# Patient Record
Sex: MALE | NOT HISPANIC OR LATINO | URBAN - METROPOLITAN AREA
[De-identification: names, ages, dates, MRNs, and addresses within clinical notes are randomized per-mention and may not be internally consistent; named-entity substitution may affect disease eponyms.]

---

## 2018-09-18 ENCOUNTER — OFFICE VISIT CONVERTED (OUTPATIENT)
Dept: CARDIOLOGY | Facility: CLINIC | Age: 58
End: 2018-09-18
Attending: SPECIALIST

## 2018-11-02 ENCOUNTER — CONVERSION ENCOUNTER (OUTPATIENT)
Dept: SURGERY | Facility: CLINIC | Age: 58
End: 2018-11-02

## 2018-11-02 ENCOUNTER — OFFICE VISIT CONVERTED (OUTPATIENT)
Dept: SURGERY | Facility: CLINIC | Age: 58
End: 2018-11-02
Attending: PHYSICIAN ASSISTANT

## 2018-12-03 ENCOUNTER — OFFICE VISIT CONVERTED (OUTPATIENT)
Dept: SURGERY | Facility: CLINIC | Age: 58
End: 2018-12-03
Attending: PHYSICIAN ASSISTANT

## 2018-12-03 ENCOUNTER — CONVERSION ENCOUNTER (OUTPATIENT)
Dept: SURGERY | Facility: CLINIC | Age: 58
End: 2018-12-03

## 2019-02-08 ENCOUNTER — HOSPITAL ENCOUNTER (OUTPATIENT)
Dept: CARDIOLOGY | Facility: HOSPITAL | Age: 59
Discharge: HOME | End: 2019-02-08

## 2021-05-15 VITALS — HEIGHT: 72 IN | BODY MASS INDEX: 33.06 KG/M2 | RESPIRATION RATE: 24 BRPM | WEIGHT: 244.12 LBS

## 2021-05-16 VITALS — WEIGHT: 252.12 LBS | BODY MASS INDEX: 34.15 KG/M2 | HEIGHT: 72 IN | RESPIRATION RATE: 16 BRPM

## 2021-05-16 VITALS
HEIGHT: 72 IN | SYSTOLIC BLOOD PRESSURE: 106 MMHG | BODY MASS INDEX: 33.59 KG/M2 | WEIGHT: 248 LBS | DIASTOLIC BLOOD PRESSURE: 74 MMHG | HEART RATE: 66 BPM

## 2024-04-03 ENCOUNTER — OFFICE VISIT (OUTPATIENT)
Dept: FAMILY MEDICINE CLINIC | Facility: CLINIC | Age: 64
End: 2024-04-03
Payer: COMMERCIAL

## 2024-04-03 VITALS
BODY MASS INDEX: 26.49 KG/M2 | OXYGEN SATURATION: 96 % | HEART RATE: 90 BPM | TEMPERATURE: 97.3 F | DIASTOLIC BLOOD PRESSURE: 80 MMHG | WEIGHT: 195.3 LBS | SYSTOLIC BLOOD PRESSURE: 140 MMHG

## 2024-04-03 DIAGNOSIS — F03.918 DEMENTIA WITH BEHAVIORAL DISTURBANCE: ICD-10-CM

## 2024-04-03 DIAGNOSIS — Z00.00 ANNUAL PHYSICAL EXAM: Primary | ICD-10-CM

## 2024-04-03 DIAGNOSIS — F41.9 ANXIETY: ICD-10-CM

## 2024-04-03 DIAGNOSIS — Z23 ENCOUNTER FOR IMMUNIZATION: ICD-10-CM

## 2024-04-03 DIAGNOSIS — M25.552 LEFT HIP PAIN: ICD-10-CM

## 2024-04-03 DIAGNOSIS — R45.4 IRRITABILITY AND ANGER: ICD-10-CM

## 2024-04-03 PROBLEM — R00.1 JUNCTIONAL BRADYCARDIA: Status: ACTIVE | Noted: 2023-06-21

## 2024-04-03 PROBLEM — I63.9 CEREBROVASCULAR ACCIDENT (CVA): Status: ACTIVE | Noted: 2021-09-30

## 2024-04-03 PROBLEM — I69.322 CVA, OLD, DYSARTHRIA: Chronic | Status: ACTIVE | Noted: 2021-09-30

## 2024-04-03 PROBLEM — G25.9 MOVEMENT DISORDER: Status: ACTIVE | Noted: 2024-02-09

## 2024-04-03 PROBLEM — R73.09 ELEVATED GLUCOSE: Status: ACTIVE | Noted: 2021-09-30

## 2024-04-03 PROBLEM — I10 ESSENTIAL HYPERTENSION: Status: ACTIVE | Noted: 2021-09-30

## 2024-04-03 PROBLEM — T65.91XA: Status: ACTIVE | Noted: 2024-04-03

## 2024-04-03 PROBLEM — E78.5 HYPERLIPIDEMIA: Status: ACTIVE | Noted: 2024-01-14

## 2024-04-03 PROBLEM — R47.1 DYSARTHRIA: Status: ACTIVE | Noted: 2023-06-20

## 2024-04-03 RX ORDER — ATORVASTATIN CALCIUM 40 MG/1
40 TABLET, FILM COATED ORAL DAILY
COMMUNITY
Start: 2024-02-09

## 2024-04-03 RX ORDER — MELATONIN
50 DAILY
COMMUNITY
Start: 2024-02-09

## 2024-04-03 RX ORDER — CLOPIDOGREL BISULFATE 75 MG/1
75 TABLET ORAL DAILY
COMMUNITY
Start: 2024-02-09

## 2024-04-03 RX ORDER — ASPIRIN 81 MG/1
81 TABLET ORAL DAILY
COMMUNITY
Start: 2024-02-09

## 2024-04-03 RX ORDER — AMLODIPINE BESYLATE 2.5 MG/1
2.5 TABLET ORAL DAILY
COMMUNITY
Start: 2024-02-09

## 2024-04-03 RX ORDER — BENZTROPINE MESYLATE 0.5 MG/1
0.5 TABLET ORAL DAILY
COMMUNITY
End: 2024-04-03

## 2024-04-03 RX ORDER — OLANZAPINE 5 MG/1
5 TABLET, ORALLY DISINTEGRATING ORAL DAILY
COMMUNITY
Start: 2024-02-26
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
## 2024-04-04 PROBLEM — F03.918 DEMENTIA WITH BEHAVIORAL DISTURBANCE: Status: ACTIVE | Noted: 2024-04-04

## 2024-04-04 PROBLEM — R45.4 IRRITABILITY AND ANGER: Status: ACTIVE | Noted: 2024-04-04

## 2024-04-04 PROBLEM — F41.9 ANXIETY: Status: ACTIVE | Noted: 2024-04-04

## 2024-04-04 NOTE — ASSESSMENT & PLAN NOTE
Follows neurology.  I advised his caretaker to call neurology and see when they want to next follow-up.  He most recently saw them at the end of February but I did not see a follow-up date listed in their note and he has no future appointment scheduled with them.    I restarted his sertraline at 50 mg daily.  I am not sure how long he has been out of the sertraline and he used to be on 25 mg but neurology had noted they wanted up him to 50 mg as well.  Since 50 mg is an acceptable starting dose, I am just restarting him right on the 50 mg.  This should help with his anxiety and anger issues as well.  I did explain that SSRI such as sertraline take a while to build up in the system so he will not notice an immediate effect.    Note that he used to be on several other medications including Cogentin, Requip, trazodone.  However at least as of January of this year he has not been on any of those.  We may need to restart some of these medications but we should do so slowly and only restart the ones that are necessary to avoid any side effects or polypharmacy.    Follow-up in 1 month or sooner if any questions or concerns.

## 2024-04-05 ENCOUNTER — TELEPHONE (OUTPATIENT)
Dept: FAMILY MEDICINE CLINIC | Facility: CLINIC | Age: 64
End: 2024-04-05

## 2024-04-05 DIAGNOSIS — F51.01 PRIMARY INSOMNIA: Primary | ICD-10-CM

## 2024-04-08 ENCOUNTER — TELEPHONE (OUTPATIENT)
Dept: FAMILY MEDICINE CLINIC | Facility: CLINIC | Age: 64
End: 2024-04-08
Payer: COMMERCIAL

## 2024-04-10 ENCOUNTER — APPOINTMENT (OUTPATIENT)
Dept: GENERAL RADIOLOGY | Facility: HOSPITAL | Age: 64
End: 2024-04-10
Payer: COMMERCIAL

## 2024-04-10 ENCOUNTER — HOSPITAL ENCOUNTER (EMERGENCY)
Facility: HOSPITAL | Age: 64
Discharge: HOME | End: 2024-04-10
Attending: EMERGENCY MEDICINE | Admitting: EMERGENCY MEDICINE
Payer: COMMERCIAL

## 2024-04-10 VITALS
OXYGEN SATURATION: 94 % | SYSTOLIC BLOOD PRESSURE: 144 MMHG | WEIGHT: 199.52 LBS | BODY MASS INDEX: 27.02 KG/M2 | RESPIRATION RATE: 18 BRPM | HEART RATE: 51 BPM | TEMPERATURE: 98.2 F | DIASTOLIC BLOOD PRESSURE: 88 MMHG | HEIGHT: 72 IN

## 2024-04-10 DIAGNOSIS — Z86.59 MENTAL STATUS CHANGE RESOLVED: Primary | ICD-10-CM

## 2024-04-10 DIAGNOSIS — F03.90 DEMENTIA, UNSPECIFIED DEMENTIA SEVERITY, UNSPECIFIED DEMENTIA TYPE, UNSPECIFIED WHETHER BEHAVIORAL, PSYCHOTIC, OR MOOD DISTURBANCE OR ANXIETY: ICD-10-CM

## 2024-04-10 LAB
ALBUMIN SERPL-MCNC: 4.1 G/DL (ref 3.5–5.2)
ALBUMIN/GLOB SERPL: 1.6 G/DL
ALP SERPL-CCNC: 99 U/L (ref 39–117)
ALT SERPL W P-5'-P-CCNC: 11 U/L (ref 1–41)
AMORPH URATE CRY URNS QL MICRO: NORMAL /HPF
ANION GAP SERPL CALCULATED.3IONS-SCNC: 10.9 MMOL/L (ref 5–15)
AST SERPL-CCNC: 18 U/L (ref 1–40)
BACTERIA UR QL AUTO: NORMAL /HPF
BASOPHILS # BLD AUTO: 0.09 10*3/MM3 (ref 0–0.2)
BASOPHILS NFR BLD AUTO: 1.8 % (ref 0–1.5)
BILIRUB SERPL-MCNC: 0.5 MG/DL (ref 0–1.2)
BILIRUB UR QL STRIP: NEGATIVE
BUN SERPL-MCNC: 18 MG/DL (ref 8–23)
BUN/CREAT SERPL: 18.6 (ref 7–25)
CALCIUM SPEC-SCNC: 9.5 MG/DL (ref 8.6–10.5)
CHLORIDE SERPL-SCNC: 106 MMOL/L (ref 98–107)
CLARITY UR: CLEAR
CO2 SERPL-SCNC: 25.1 MMOL/L (ref 22–29)
COLOR UR: YELLOW
CREAT SERPL-MCNC: 0.97 MG/DL (ref 0.76–1.27)
DEPRECATED RDW RBC AUTO: 44.1 FL (ref 37–54)
EGFRCR SERPLBLD CKD-EPI 2021: 88.8 ML/MIN/1.73
EOSINOPHIL # BLD AUTO: 0.16 10*3/MM3 (ref 0–0.4)
EOSINOPHIL NFR BLD AUTO: 3.3 % (ref 0.3–6.2)
ERYTHROCYTE [DISTWIDTH] IN BLOOD BY AUTOMATED COUNT: 13.6 % (ref 12.3–15.4)
GLOBULIN UR ELPH-MCNC: 2.6 GM/DL
GLUCOSE SERPL-MCNC: 89 MG/DL (ref 65–99)
GLUCOSE UR STRIP-MCNC: NEGATIVE MG/DL
HCT VFR BLD AUTO: 45.2 % (ref 37.5–51)
HGB BLD-MCNC: 15.3 G/DL (ref 13–17.7)
HGB UR QL STRIP.AUTO: NEGATIVE
HOLD SPECIMEN: NORMAL
HOLD SPECIMEN: NORMAL
HYALINE CASTS UR QL AUTO: NORMAL /LPF
IMM GRANULOCYTES # BLD AUTO: 0.01 10*3/MM3 (ref 0–0.05)
IMM GRANULOCYTES NFR BLD AUTO: 0.2 % (ref 0–0.5)
KETONES UR QL STRIP: NEGATIVE
LEUKOCYTE ESTERASE UR QL STRIP.AUTO: ABNORMAL
LYMPHOCYTES # BLD AUTO: 1.2 10*3/MM3 (ref 0.7–3.1)
LYMPHOCYTES NFR BLD AUTO: 24.4 % (ref 19.6–45.3)
MAGNESIUM SERPL-MCNC: 1.7 MG/DL (ref 1.6–2.4)
MCH RBC QN AUTO: 30.2 PG (ref 26.6–33)
MCHC RBC AUTO-ENTMCNC: 33.8 G/DL (ref 31.5–35.7)
MCV RBC AUTO: 89.2 FL (ref 79–97)
MONOCYTES # BLD AUTO: 0.61 10*3/MM3 (ref 0.1–0.9)
MONOCYTES NFR BLD AUTO: 12.4 % (ref 5–12)
NEUTROPHILS NFR BLD AUTO: 2.84 10*3/MM3 (ref 1.7–7)
NEUTROPHILS NFR BLD AUTO: 57.9 % (ref 42.7–76)
NITRITE UR QL STRIP: NEGATIVE
NRBC BLD AUTO-RTO: 0 /100 WBC (ref 0–0.2)
PH UR STRIP.AUTO: 5.5 [PH] (ref 5–8)
PLATELET # BLD AUTO: 203 10*3/MM3 (ref 140–450)
PMV BLD AUTO: 10.9 FL (ref 6–12)
POTASSIUM SERPL-SCNC: 4.1 MMOL/L (ref 3.5–5.2)
PROT SERPL-MCNC: 6.7 G/DL (ref 6–8.5)
PROT UR QL STRIP: NEGATIVE
RBC # BLD AUTO: 5.07 10*6/MM3 (ref 4.14–5.8)
RBC # UR STRIP: NORMAL /HPF
REF LAB TEST METHOD: NORMAL
SODIUM SERPL-SCNC: 142 MMOL/L (ref 136–145)
SP GR UR STRIP: 1.02 (ref 1–1.03)
SQUAMOUS #/AREA URNS HPF: NORMAL /HPF
TROPONIN T SERPL HS-MCNC: 12 NG/L
UROBILINOGEN UR QL STRIP: ABNORMAL
WBC # UR STRIP: NORMAL /HPF
WBC NRBC COR # BLD AUTO: 4.91 10*3/MM3 (ref 3.4–10.8)
WHOLE BLOOD HOLD COAG: NORMAL
WHOLE BLOOD HOLD SPECIMEN: NORMAL

## 2024-04-10 PROCEDURE — 83735 ASSAY OF MAGNESIUM: CPT

## 2024-04-10 PROCEDURE — 81001 URINALYSIS AUTO W/SCOPE: CPT | Performed by: EMERGENCY MEDICINE

## 2024-04-10 PROCEDURE — 99283 EMERGENCY DEPT VISIT LOW MDM: CPT

## 2024-04-10 PROCEDURE — 84484 ASSAY OF TROPONIN QUANT: CPT

## 2024-04-10 PROCEDURE — 80053 COMPREHEN METABOLIC PANEL: CPT

## 2024-04-10 PROCEDURE — 85025 COMPLETE CBC W/AUTO DIFF WBC: CPT

## 2024-04-10 PROCEDURE — 71045 X-RAY EXAM CHEST 1 VIEW: CPT

## 2024-04-10 RX ORDER — SODIUM CHLORIDE 0.9 % (FLUSH) 0.9 %
10 SYRINGE (ML) INJECTION AS NEEDED
Status: DISCONTINUED | OUTPATIENT
Start: 2024-04-10 | End: 2024-04-10 | Stop reason: HOSPADM

## 2024-04-10 NOTE — ED PROVIDER NOTES
Time: 1:26 PM EDT  Date of encounter:  4/10/2024  Independent Historian/Clinical History and Information was obtained by:   Patient and Nursing Staff    History is limited by: N/A    Chief Complaint: Mental status change      History of Present Illness:  Patient is a 63 y.o. year old male who presents to the emergency department for evaluation of mental status change.  Per report the patient was having some mental status change.  Per nursing they state the caregiver dropped off for mental status change.  He they report that he is having his good days and bad days.  He denies any current complaints at this time.  He states he feels completely at his baseline and does not want any thing else done.  No fever.  No other complaints this time.    HPI    Patient Care Team  Primary Care Provider: Shelton Terry MD    Past Medical History:     Allergies   Allergen Reactions    Codeine Shortness Of Breath    Acetaminophen Palpitations     Tylenol 3     Past Medical History:   Diagnosis Date    Hyperlipidemia     Stroke      History reviewed. No pertinent surgical history.  Family History   Family history unknown: Yes       Home Medications:  Prior to Admission medications    Medication Sig Start Date End Date Taking? Authorizing Provider   amLODIPine (NORVASC) 2.5 MG tablet Take 1 tablet by mouth Daily. 2/9/24   Arturo Garcia MD   aspirin 81 MG EC tablet Take 1 tablet by mouth Daily. 2/9/24   Arturo Garcia MD   atorvastatin (LIPITOR) 40 MG tablet Take 1 tablet by mouth Daily. 2/9/24   Arturo Garcia MD   Cholecalciferol 25 MCG (1000 UT) tablet Take 2 tablets by mouth Daily. 2/9/24   Arturo Garcia MD   clopidogrel (PLAVIX) 75 MG tablet Take 1 tablet by mouth Daily. 2/9/24   Arturo Garcia MD   Diclofenac Sodium (Voltaren) 1 % gel gel Apply 4 g topically to the appropriate area as directed 4 (Four) Times a Day As Needed (hand pain). 4/3/24   Shelton Terry MD   Melatonin 3 MG  "capsule Take 1 capsule by mouth At Night As Needed (insomnia) for up to 60 days. Take a few hours before bedtime. 4/5/24 6/4/24  Shelton Terry MD   OLANZapine zydis (zyPREXA) 5 MG disintegrating tablet Take 1 tablet by mouth Daily. 2/26/24   Provider, MD Arturo   sertraline (ZOLOFT) 50 MG tablet Take 1 tablet by mouth Daily for 180 days. 4/3/24 9/30/24  Shelton Terry MD        Social History:   Social History     Tobacco Use    Smoking status: Never    Smokeless tobacco: Never   Vaping Use    Vaping status: Never Used   Substance Use Topics    Alcohol use: Never    Drug use: Never         Review of Systems:  Review of Systems     Physical Exam:  /100   Pulse 51   Temp 98.2 °F (36.8 °C) (Oral)   Resp 18   Ht 182.9 cm (72\")   Wt 90.5 kg (199 lb 8.3 oz)   SpO2 94%   BMI 27.06 kg/m²     Physical Exam  Vitals and nursing note reviewed.   Constitutional:       Appearance: Normal appearance.   HENT:      Head: Normocephalic and atraumatic.   Eyes:      General: No scleral icterus.  Cardiovascular:      Rate and Rhythm: Normal rate and regular rhythm.      Heart sounds: Normal heart sounds.   Pulmonary:      Effort: Pulmonary effort is normal.      Breath sounds: Normal breath sounds.   Abdominal:      Palpations: Abdomen is soft.      Tenderness: There is no abdominal tenderness.   Musculoskeletal:         General: Normal range of motion.      Cervical back: Normal range of motion.   Skin:     Findings: No rash.   Neurological:      General: No focal deficit present.      Mental Status: He is alert.                  Procedures:  Procedures      Medical Decision Making:      Comorbidities that affect care:    Dementia, hypertension, stroke    External Notes reviewed:    Reviewed PCP note from 4/3/2024      The following orders were placed and all results were independently analyzed by me:  Orders Placed This Encounter   Procedures    XR Chest 1 View    [place] Draw    Comprehensive Metabolic " Panel    Single High Sensitivity Troponin T    Magnesium    Urinalysis With Microscopic If Indicated (No Culture) - Urine, Clean Catch    CBC Auto Differential    Urinalysis, Microscopic Only - Urine, Clean Catch    NPO Diet NPO Type: Strict NPO    Undress & Gown    Continuous Pulse Oximetry    Vital Signs    Orthostatic Blood Pressure    Oxygen Therapy- Nasal Cannula; Titrate 1-6 LPM Per SpO2; 90 - 95%    POC Glucose Once    Insert Peripheral IV    Fall Precautions    CBC & Differential    Green Top (Gel)    Lavender Top    Gold Top - SST    Light Blue Top       Medications Given in the Emergency Department:  Medications   sodium chloride 0.9 % flush 10 mL (has no administration in time range)        ED Course:         Labs:    Lab Results (last 24 hours)       Procedure Component Value Units Date/Time    CBC & Differential [[id number]]  (Abnormal) Collected: 04/10/24 1122    Specimen: Blood Updated: 04/10/24 1130    Narrative:      The following orders were created for panel order CBC & Differential.  Procedure                               Abnormality         Status                     ---------                               -----------         ------                     CBC Auto Differential[[id number]]        Abnormal            Final result                 Please view results for these tests on the individual orders.    Comprehensive Metabolic Panel [[id number]] Collected: 04/10/24 1122    Specimen: Blood Updated: 04/10/24 1149     Glucose 89 mg/dL      BUN 18 mg/dL      Creatinine 0.97 mg/dL      Sodium 142 mmol/L      Potassium 4.1 mmol/L      Chloride 106 mmol/L      CO2 25.1 mmol/L      Calcium 9.5 mg/dL      Total Protein 6.7 g/dL      Albumin 4.1 g/dL      ALT (SGPT) 11 U/L      AST (SGOT) 18 U/L      Alkaline Phosphatase 99 U/L      Total Bilirubin 0.5 mg/dL      Globulin 2.6 gm/dL      A/G Ratio 1.6 g/dL      BUN/Creatinine Ratio 18.6     Anion Gap 10.9 mmol/L      eGFR 88.8 mL/min/1.73     Narrative:       GFR Normal >60  Chronic Kidney Disease <60  Kidney Failure <15      Single High Sensitivity Troponin T [[id number]]  (Normal) Collected: 04/10/24 1122    Specimen: Blood Updated: 04/10/24 1149     HS Troponin T 12 ng/L     Narrative:      High Sensitive Troponin T Reference Range:  <14.0 ng/L- Negative Female for AMI  <22.0 ng/L- Negative Male for AMI  >=14 - Abnormal Female indicating possible myocardial injury.  >=22 - Abnormal Male indicating possible myocardial injury.   Clinicians would have to utilize clinical acumen, EKG, Troponin, and serial changes to determine if it is an Acute Myocardial Infarction or myocardial injury due to an underlying chronic condition.         Magnesium [[id number]]  (Normal) Collected: 04/10/24 1122    Specimen: Blood Updated: 04/10/24 1149     Magnesium 1.7 mg/dL     CBC Auto Differential [[id number]]  (Abnormal) Collected: 04/10/24 1122    Specimen: Blood Updated: 04/10/24 1130     WBC 4.91 10*3/mm3      RBC 5.07 10*6/mm3      Hemoglobin 15.3 g/dL      Hematocrit 45.2 %      MCV 89.2 fL      MCH 30.2 pg      MCHC 33.8 g/dL      RDW 13.6 %      RDW-SD 44.1 fl      MPV 10.9 fL      Platelets 203 10*3/mm3      Neutrophil % 57.9 %      Lymphocyte % 24.4 %      Monocyte % 12.4 %      Eosinophil % 3.3 %      Basophil % 1.8 %      Immature Grans % 0.2 %      Neutrophils, Absolute 2.84 10*3/mm3      Lymphocytes, Absolute 1.20 10*3/mm3      Monocytes, Absolute 0.61 10*3/mm3      Eosinophils, Absolute 0.16 10*3/mm3      Basophils, Absolute 0.09 10*3/mm3      Immature Grans, Absolute 0.01 10*3/mm3      nRBC 0.0 /100 WBC     Urinalysis With Microscopic If Indicated (No Culture) - Urine, Clean Catch [[id number]]  (Abnormal) Collected: 04/10/24 1310    Specimen: Urine, Clean Catch Updated: 04/10/24 1327     Color, UA Yellow     Appearance, UA Clear     pH, UA 5.5     Specific Gravity, UA 1.024     Glucose, UA Negative     Ketones, UA Negative     Bilirubin, UA Negative     Blood, UA Negative      Protein, UA Negative     Leuk Esterase, UA Trace     Nitrite, UA Negative     Urobilinogen, UA 1.0 E.U./dL    Urinalysis, Microscopic Only - Urine, Clean Catch [[id number]] Collected: 04/10/24 1310    Specimen: Urine, Clean Catch Updated: 04/10/24 1332     RBC, UA None Seen /HPF      WBC, UA 0-2 /HPF      Bacteria, UA None Seen /HPF      Squamous Epithelial Cells, UA None Seen /HPF      Hyaline Casts, UA None Seen /LPF      Amorphous Crystals, UA Moderate/2+ /HPF      Methodology Manual Light Microscopy             Imaging:    XR Chest 1 View    Result Date: 4/10/2024  XR CHEST 1 VW-  Date of Exam: 4/10/2024 11:37 AM  Indication: Weak/Dizzy/AMS triage protocol  Comparison: Chest AP dated 5/22/2018  Findings: The lungs are clear bilaterally. The cardiac and mediastinal silhouettes appear normal. No effusion is seen.      Impression: 1.  No acute cardiopulmonary disease   Electronically Signed By-Hung Camarillo MD On:4/10/2024 12:33 PM         Differential Diagnosis and Discussion:    Altered Mental Status: Based on the patient's signs and symptoms, differential diagnosis includes but is not limited to meningitis, stroke, sepsis, subarachnoid hemorrhage, intracranial bleeding, encephalitis, and metabolic encephalopathy.    All labs were reviewed and interpreted by me.  All X-rays impressions were independently interpreted by me.    MDM     Patient is a 63-year-old gentleman who has a history of dementia as well as stroke who presents with complaints of possible altered mental status.  He is completely normal here and at his baseline.  Caregiver had called and spoke with nursing who stated he has periods where he is completely alert and oriented and periods where he is not.  According to previous record he has a history of dementia.  He does not meet possible criteria for nursing home placement but caregiver stated that she could take care of him.  Labs and imaging here did not show any acute findings.  Since he is at  his baseline I do not think he needs a head scan.  Will have the patient followed up as an outpatient.  Will DC.          Patient Care Considerations:    CT of the head but patient currently at baseline      Consultants/Shared Management Plan:    None    Social Determinants of Health:    Patient has presented with family members who are responsible, reliable and will ensure follow up care.      Disposition and Care Coordination:    Discharged: The patient is suitable and stable for discharge with no need for consideration of admission.    I have explained the patient´s condition, diagnoses and treatment plan based on the information available to me at this time. I have answered questions and addressed any concerns. The patient has a good  understanding of the patient´s diagnosis, condition, and treatment plan as can be expected at this point. The vital signs have been stable. The patient´s condition is stable and appropriate for discharge from the emergency department.      The patient will pursue further outpatient evaluation with the primary care physician or other designated or consulting physician as outlined in the discharge instructions. They are agreeable to this plan of care and follow-up instructions have been explained in detail. The patient has received these instructions in written format and have expressed an understanding of the discharge instructions. The patient is aware that any significant change in condition or worsening of symptoms should prompt an immediate return to this or the closest emergency department or call to 911.      Final diagnoses:   Mental status change resolved   Dementia, unspecified dementia severity, unspecified dementia type, unspecified whether behavioral, psychotic, or mood disturbance or anxiety        ED Disposition       ED Disposition   Discharge    Condition   Stable    Comment   --               This medical record created using voice recognition software.              Andrae Fajardo MD  04/10/24 1957

## 2024-04-15 ENCOUNTER — APPOINTMENT (OUTPATIENT)
Dept: CT IMAGING | Facility: HOSPITAL | Age: 64
DRG: 884 | End: 2024-04-15
Payer: COMMERCIAL

## 2024-04-15 ENCOUNTER — HOSPITAL ENCOUNTER (INPATIENT)
Facility: HOSPITAL | Age: 64
LOS: 6 days | Discharge: HOME | DRG: 884 | End: 2024-04-21
Attending: EMERGENCY MEDICINE | Admitting: FAMILY MEDICINE
Payer: COMMERCIAL

## 2024-04-15 DIAGNOSIS — F01.B11 MODERATE VASCULAR DEMENTIA WITH AGITATION: Primary | ICD-10-CM

## 2024-04-15 DIAGNOSIS — R26.2 DIFFICULTY WALKING: ICD-10-CM

## 2024-04-15 DIAGNOSIS — Z78.9 DECREASED ACTIVITIES OF DAILY LIVING (ADL): ICD-10-CM

## 2024-04-15 DIAGNOSIS — R13.12 OROPHARYNGEAL DYSPHAGIA: ICD-10-CM

## 2024-04-15 PROBLEM — R53.81 PHYSICAL DECONDITIONING: Status: ACTIVE | Noted: 2024-04-15

## 2024-04-15 LAB
ALBUMIN SERPL-MCNC: 4.1 G/DL (ref 3.5–5.2)
ALBUMIN/GLOB SERPL: 1.6 G/DL
ALP SERPL-CCNC: 90 U/L (ref 39–117)
ALT SERPL W P-5'-P-CCNC: 18 U/L (ref 1–41)
AMMONIA BLD-SCNC: 23 UMOL/L (ref 16–60)
AMPHET+METHAMPHET UR QL: NEGATIVE
ANION GAP SERPL CALCULATED.3IONS-SCNC: 9.8 MMOL/L (ref 5–15)
AST SERPL-CCNC: 22 U/L (ref 1–40)
BARBITURATES UR QL SCN: NEGATIVE
BASOPHILS # BLD AUTO: 0.1 10*3/MM3 (ref 0–0.2)
BASOPHILS NFR BLD AUTO: 2 % (ref 0–1.5)
BENZODIAZ UR QL SCN: NEGATIVE
BILIRUB SERPL-MCNC: 0.5 MG/DL (ref 0–1.2)
BILIRUB UR QL STRIP: NEGATIVE
BUN SERPL-MCNC: 23 MG/DL (ref 8–23)
BUN/CREAT SERPL: 24.7 (ref 7–25)
CALCIUM SPEC-SCNC: 8.9 MG/DL (ref 8.6–10.5)
CANNABINOIDS SERPL QL: NEGATIVE
CHLORIDE SERPL-SCNC: 107 MMOL/L (ref 98–107)
CLARITY UR: CLEAR
CO2 SERPL-SCNC: 26.2 MMOL/L (ref 22–29)
COCAINE UR QL: NEGATIVE
COLOR UR: YELLOW
CREAT SERPL-MCNC: 0.93 MG/DL (ref 0.76–1.27)
DEPRECATED RDW RBC AUTO: 44.8 FL (ref 37–54)
EGFRCR SERPLBLD CKD-EPI 2021: 93.4 ML/MIN/1.73
EOSINOPHIL # BLD AUTO: 0.12 10*3/MM3 (ref 0–0.4)
EOSINOPHIL NFR BLD AUTO: 2.4 % (ref 0.3–6.2)
ERYTHROCYTE [DISTWIDTH] IN BLOOD BY AUTOMATED COUNT: 13.5 % (ref 12.3–15.4)
ETHANOL BLD-MCNC: <10 MG/DL (ref 0–10)
ETHANOL UR QL: <0.01 %
FENTANYL UR-MCNC: NEGATIVE NG/ML
GLOBULIN UR ELPH-MCNC: 2.5 GM/DL
GLUCOSE SERPL-MCNC: 82 MG/DL (ref 65–99)
GLUCOSE UR STRIP-MCNC: NEGATIVE MG/DL
HCT VFR BLD AUTO: 45.1 % (ref 37.5–51)
HGB BLD-MCNC: 15 G/DL (ref 13–17.7)
HGB UR QL STRIP.AUTO: NEGATIVE
IMM GRANULOCYTES # BLD AUTO: 0.02 10*3/MM3 (ref 0–0.05)
IMM GRANULOCYTES NFR BLD AUTO: 0.4 % (ref 0–0.5)
KETONES UR QL STRIP: NEGATIVE
LEUKOCYTE ESTERASE UR QL STRIP.AUTO: NEGATIVE
LYMPHOCYTES # BLD AUTO: 1.18 10*3/MM3 (ref 0.7–3.1)
LYMPHOCYTES NFR BLD AUTO: 23.3 % (ref 19.6–45.3)
MAGNESIUM SERPL-MCNC: 1.7 MG/DL (ref 1.6–2.4)
MCH RBC QN AUTO: 30.1 PG (ref 26.6–33)
MCHC RBC AUTO-ENTMCNC: 33.3 G/DL (ref 31.5–35.7)
MCV RBC AUTO: 90.4 FL (ref 79–97)
METHADONE UR QL SCN: NEGATIVE
MONOCYTES # BLD AUTO: 0.55 10*3/MM3 (ref 0.1–0.9)
MONOCYTES NFR BLD AUTO: 10.8 % (ref 5–12)
NEUTROPHILS NFR BLD AUTO: 3.1 10*3/MM3 (ref 1.7–7)
NEUTROPHILS NFR BLD AUTO: 61.1 % (ref 42.7–76)
NITRITE UR QL STRIP: NEGATIVE
NRBC BLD AUTO-RTO: 0 /100 WBC (ref 0–0.2)
OPIATES UR QL: NEGATIVE
OXYCODONE UR QL SCN: NEGATIVE
PH UR STRIP.AUTO: 5.5 [PH] (ref 5–8)
PLATELET # BLD AUTO: 187 10*3/MM3 (ref 140–450)
PMV BLD AUTO: 11.2 FL (ref 6–12)
POTASSIUM SERPL-SCNC: 3.9 MMOL/L (ref 3.5–5.2)
PROT SERPL-MCNC: 6.6 G/DL (ref 6–8.5)
PROT UR QL STRIP: NEGATIVE
RBC # BLD AUTO: 4.99 10*6/MM3 (ref 4.14–5.8)
SODIUM SERPL-SCNC: 143 MMOL/L (ref 136–145)
SP GR UR STRIP: 1.03 (ref 1–1.03)
T4 FREE SERPL-MCNC: 1 NG/DL (ref 0.92–1.68)
TSH SERPL DL<=0.05 MIU/L-ACNC: 2.5 UIU/ML (ref 0.27–4.2)
UROBILINOGEN UR QL STRIP: NORMAL
WBC NRBC COR # BLD AUTO: 5.07 10*3/MM3 (ref 3.4–10.8)

## 2024-04-15 PROCEDURE — 80307 DRUG TEST PRSMV CHEM ANLYZR: CPT | Performed by: EMERGENCY MEDICINE

## 2024-04-15 PROCEDURE — 70450 CT HEAD/BRAIN W/O DYE: CPT

## 2024-04-15 PROCEDURE — 36415 COLL VENOUS BLD VENIPUNCTURE: CPT

## 2024-04-15 PROCEDURE — 83735 ASSAY OF MAGNESIUM: CPT | Performed by: EMERGENCY MEDICINE

## 2024-04-15 PROCEDURE — 82140 ASSAY OF AMMONIA: CPT | Performed by: EMERGENCY MEDICINE

## 2024-04-15 PROCEDURE — 51702 INSERT TEMP BLADDER CATH: CPT

## 2024-04-15 PROCEDURE — 84439 ASSAY OF FREE THYROXINE: CPT | Performed by: EMERGENCY MEDICINE

## 2024-04-15 PROCEDURE — 80050 GENERAL HEALTH PANEL: CPT | Performed by: EMERGENCY MEDICINE

## 2024-04-15 PROCEDURE — 99222 1ST HOSP IP/OBS MODERATE 55: CPT | Performed by: FAMILY MEDICINE

## 2024-04-15 PROCEDURE — 93005 ELECTROCARDIOGRAM TRACING: CPT | Performed by: EMERGENCY MEDICINE

## 2024-04-15 PROCEDURE — 81003 URINALYSIS AUTO W/O SCOPE: CPT | Performed by: EMERGENCY MEDICINE

## 2024-04-15 PROCEDURE — 25010000002 ZIPRASIDONE MESYLATE PER 10 MG: Performed by: EMERGENCY MEDICINE

## 2024-04-15 PROCEDURE — 99285 EMERGENCY DEPT VISIT HI MDM: CPT

## 2024-04-15 PROCEDURE — 82077 ASSAY SPEC XCP UR&BREATH IA: CPT | Performed by: EMERGENCY MEDICINE

## 2024-04-15 RX ORDER — SODIUM CHLORIDE 0.9 % (FLUSH) 0.9 %
10 SYRINGE (ML) INJECTION AS NEEDED
Status: DISCONTINUED | OUTPATIENT
Start: 2024-04-15 | End: 2024-04-21 | Stop reason: HOSPADM

## 2024-04-15 RX ORDER — ZIPRASIDONE MESYLATE 20 MG/ML
20 INJECTION, POWDER, LYOPHILIZED, FOR SOLUTION INTRAMUSCULAR ONCE
Status: COMPLETED | OUTPATIENT
Start: 2024-04-15 | End: 2024-04-15

## 2024-04-15 RX ADMIN — ZIPRASIDONE MESYLATE 20 MG: 20 INJECTION, POWDER, LYOPHILIZED, FOR SOLUTION INTRAMUSCULAR at 19:06
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
## 2024-04-15 NOTE — ED NOTES
When asked questions to check neuro status patient did not know the year, month, or why he was here, he also did not know what town he was in, he does know who he is.
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
## 2024-04-15 NOTE — SIGNIFICANT NOTE
04/15/24 1920   Plan   Final Note RUCHI continuation from prior note. RUCHI ensured Radha was ok, however, if pt was able to discharge this date she would need to be present at the hospital to  pt due to not being able to return to his prior arrangements. Radha advised RUCHI that she understood and would contact RUCHI back. RUCHI provided Radha with her direct phone number, [phone], for contact this date. RUCHI updated provider.
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
## 2024-04-16 LAB
ALBUMIN SERPL-MCNC: 3.8 G/DL (ref 3.5–5.2)
ALBUMIN/GLOB SERPL: 1.5 G/DL
ALP SERPL-CCNC: 80 U/L (ref 39–117)
ALT SERPL W P-5'-P-CCNC: 17 U/L (ref 1–41)
ANION GAP SERPL CALCULATED.3IONS-SCNC: 11.5 MMOL/L (ref 5–15)
AST SERPL-CCNC: 23 U/L (ref 1–40)
BASOPHILS # BLD AUTO: 0.14 10*3/MM3 (ref 0–0.2)
BASOPHILS NFR BLD AUTO: 2.5 % (ref 0–1.5)
BILIRUB SERPL-MCNC: 0.6 MG/DL (ref 0–1.2)
BUN SERPL-MCNC: 21 MG/DL (ref 8–23)
BUN/CREAT SERPL: 27.6 (ref 7–25)
CALCIUM SPEC-SCNC: 8.8 MG/DL (ref 8.6–10.5)
CHLORIDE SERPL-SCNC: 107 MMOL/L (ref 98–107)
CO2 SERPL-SCNC: 22.5 MMOL/L (ref 22–29)
CREAT SERPL-MCNC: 0.76 MG/DL (ref 0.76–1.27)
DEPRECATED RDW RBC AUTO: 44.9 FL (ref 37–54)
EGFRCR SERPLBLD CKD-EPI 2021: 102.3 ML/MIN/1.73
EOSINOPHIL # BLD AUTO: 0.18 10*3/MM3 (ref 0–0.4)
EOSINOPHIL NFR BLD AUTO: 3.2 % (ref 0.3–6.2)
ERYTHROCYTE [DISTWIDTH] IN BLOOD BY AUTOMATED COUNT: 13.4 % (ref 12.3–15.4)
GLOBULIN UR ELPH-MCNC: 2.5 GM/DL
GLUCOSE SERPL-MCNC: 76 MG/DL (ref 65–99)
HCT VFR BLD AUTO: 45.8 % (ref 37.5–51)
HGB BLD-MCNC: 14.5 G/DL (ref 13–17.7)
IMM GRANULOCYTES # BLD AUTO: 0.01 10*3/MM3 (ref 0–0.05)
IMM GRANULOCYTES NFR BLD AUTO: 0.2 % (ref 0–0.5)
LYMPHOCYTES # BLD AUTO: 1.35 10*3/MM3 (ref 0.7–3.1)
LYMPHOCYTES NFR BLD AUTO: 24 % (ref 19.6–45.3)
MCH RBC QN AUTO: 29.4 PG (ref 26.6–33)
MCHC RBC AUTO-ENTMCNC: 31.7 G/DL (ref 31.5–35.7)
MCV RBC AUTO: 92.7 FL (ref 79–97)
MONOCYTES # BLD AUTO: 0.59 10*3/MM3 (ref 0.1–0.9)
MONOCYTES NFR BLD AUTO: 10.5 % (ref 5–12)
NEUTROPHILS NFR BLD AUTO: 3.35 10*3/MM3 (ref 1.7–7)
NEUTROPHILS NFR BLD AUTO: 59.6 % (ref 42.7–76)
NRBC BLD AUTO-RTO: 0 /100 WBC (ref 0–0.2)
PLATELET # BLD AUTO: 183 10*3/MM3 (ref 140–450)
PMV BLD AUTO: 11.4 FL (ref 6–12)
POTASSIUM SERPL-SCNC: 3.7 MMOL/L (ref 3.5–5.2)
PROT SERPL-MCNC: 6.3 G/DL (ref 6–8.5)
RBC # BLD AUTO: 4.94 10*6/MM3 (ref 4.14–5.8)
SODIUM SERPL-SCNC: 141 MMOL/L (ref 136–145)
WBC NRBC COR # BLD AUTO: 5.62 10*3/MM3 (ref 3.4–10.8)

## 2024-04-16 PROCEDURE — 92610 EVALUATE SWALLOWING FUNCTION: CPT

## 2024-04-16 PROCEDURE — 25010000002 HEPARIN (PORCINE) PER 1000 UNITS: Performed by: FAMILY MEDICINE

## 2024-04-16 PROCEDURE — 25810000003 SODIUM CHLORIDE 0.9 % SOLUTION: Performed by: FAMILY MEDICINE

## 2024-04-16 PROCEDURE — 80053 COMPREHEN METABOLIC PANEL: CPT | Performed by: FAMILY MEDICINE

## 2024-04-16 PROCEDURE — 85025 COMPLETE CBC W/AUTO DIFF WBC: CPT | Performed by: FAMILY MEDICINE

## 2024-04-16 PROCEDURE — 99232 SBSQ HOSP IP/OBS MODERATE 35: CPT | Performed by: INTERNAL MEDICINE

## 2024-04-16 RX ORDER — BISACODYL 5 MG/1
5 TABLET, DELAYED RELEASE ORAL DAILY PRN
Status: DISCONTINUED | OUTPATIENT
Start: 2024-04-16 | End: 2024-04-21 | Stop reason: HOSPADM

## 2024-04-16 RX ORDER — AMOXICILLIN 250 MG
2 CAPSULE ORAL 2 TIMES DAILY PRN
Status: DISCONTINUED | OUTPATIENT
Start: 2024-04-16 | End: 2024-04-21 | Stop reason: HOSPADM

## 2024-04-16 RX ORDER — HALOPERIDOL 5 MG/ML
3 INJECTION INTRAMUSCULAR EVERY 6 HOURS PRN
Status: DISCONTINUED | OUTPATIENT
Start: 2024-04-16 | End: 2024-04-17

## 2024-04-16 RX ORDER — SODIUM CHLORIDE 9 MG/ML
40 INJECTION, SOLUTION INTRAVENOUS AS NEEDED
Status: DISCONTINUED | OUTPATIENT
Start: 2024-04-16 | End: 2024-04-21 | Stop reason: HOSPADM

## 2024-04-16 RX ORDER — SODIUM CHLORIDE 0.9 % (FLUSH) 0.9 %
10 SYRINGE (ML) INJECTION EVERY 12 HOURS SCHEDULED
Status: DISCONTINUED | OUTPATIENT
Start: 2024-04-16 | End: 2024-04-21 | Stop reason: HOSPADM

## 2024-04-16 RX ORDER — CLOPIDOGREL BISULFATE 75 MG/1
75 TABLET ORAL DAILY
Status: DISCONTINUED | OUTPATIENT
Start: 2024-04-17 | End: 2024-04-21 | Stop reason: HOSPADM

## 2024-04-16 RX ORDER — MELATONIN
2000 DAILY
Status: DISCONTINUED | OUTPATIENT
Start: 2024-04-17 | End: 2024-04-21 | Stop reason: HOSPADM

## 2024-04-16 RX ORDER — SODIUM CHLORIDE 0.9 % (FLUSH) 0.9 %
10 SYRINGE (ML) INJECTION AS NEEDED
Status: DISCONTINUED | OUTPATIENT
Start: 2024-04-16 | End: 2024-04-21 | Stop reason: HOSPADM

## 2024-04-16 RX ORDER — RISPERIDONE 1 MG/ML
0.5 SOLUTION ORAL NIGHTLY
Status: DISCONTINUED | OUTPATIENT
Start: 2024-04-16 | End: 2024-04-16

## 2024-04-16 RX ORDER — AMLODIPINE BESYLATE 5 MG/1
2.5 TABLET ORAL DAILY
Status: DISCONTINUED | OUTPATIENT
Start: 2024-04-17 | End: 2024-04-21 | Stop reason: HOSPADM

## 2024-04-16 RX ORDER — ASPIRIN 81 MG/1
81 TABLET ORAL DAILY
Status: DISCONTINUED | OUTPATIENT
Start: 2024-04-17 | End: 2024-04-21 | Stop reason: HOSPADM

## 2024-04-16 RX ORDER — POLYETHYLENE GLYCOL 3350 17 G/17G
17 POWDER, FOR SOLUTION ORAL DAILY PRN
Status: DISCONTINUED | OUTPATIENT
Start: 2024-04-16 | End: 2024-04-21 | Stop reason: HOSPADM

## 2024-04-16 RX ORDER — BISACODYL 10 MG
10 SUPPOSITORY, RECTAL RECTAL DAILY PRN
Status: DISCONTINUED | OUTPATIENT
Start: 2024-04-16 | End: 2024-04-21 | Stop reason: HOSPADM

## 2024-04-16 RX ORDER — SODIUM CHLORIDE 9 MG/ML
75 INJECTION, SOLUTION INTRAVENOUS CONTINUOUS
Status: DISCONTINUED | OUTPATIENT
Start: 2024-04-16 | End: 2024-04-17

## 2024-04-16 RX ORDER — HEPARIN SODIUM 5000 [USP'U]/ML
5000 INJECTION, SOLUTION INTRAVENOUS; SUBCUTANEOUS EVERY 8 HOURS SCHEDULED
Status: DISCONTINUED | OUTPATIENT
Start: 2024-04-16 | End: 2024-04-16

## 2024-04-16 RX ORDER — ZIPRASIDONE MESYLATE 20 MG/ML
20 INJECTION, POWDER, LYOPHILIZED, FOR SOLUTION INTRAMUSCULAR EVERY 4 HOURS PRN
Status: DISCONTINUED | OUTPATIENT
Start: 2024-04-16 | End: 2024-04-16

## 2024-04-16 RX ORDER — ATORVASTATIN CALCIUM 40 MG/1
40 TABLET, FILM COATED ORAL NIGHTLY
Status: DISCONTINUED | OUTPATIENT
Start: 2024-04-16 | End: 2024-04-21 | Stop reason: HOSPADM

## 2024-04-16 RX ORDER — OLANZAPINE 5 MG/1
5 TABLET, ORALLY DISINTEGRATING ORAL NIGHTLY
Status: DISCONTINUED | OUTPATIENT
Start: 2024-04-17 | End: 2024-04-21 | Stop reason: HOSPADM

## 2024-04-16 RX ADMIN — HEPARIN SODIUM 5000 UNITS: 5000 INJECTION INTRAVENOUS; SUBCUTANEOUS at 06:00

## 2024-04-16 RX ADMIN — Medication 10 ML: at 02:17

## 2024-04-16 RX ADMIN — OLANZAPINE 5 MG: 5 TABLET, ORALLY DISINTEGRATING ORAL at 23:43

## 2024-04-16 RX ADMIN — RISPERIDONE 0.5 MG: 1 SOLUTION ORAL at 20:08

## 2024-04-16 RX ADMIN — APIXABAN 2.5 MG: 2.5 TABLET, FILM COATED ORAL at 20:08

## 2024-04-16 RX ADMIN — Medication 10 ML: at 20:09

## 2024-04-16 RX ADMIN — ATORVASTATIN CALCIUM 40 MG: 40 TABLET, FILM COATED ORAL at 23:43

## 2024-04-16 RX ADMIN — SODIUM CHLORIDE 75 ML/HR: 9 INJECTION, SOLUTION INTRAVENOUS at 15:43

## 2024-04-16 RX ADMIN — SODIUM CHLORIDE 75 ML/HR: 9 INJECTION, SOLUTION INTRAVENOUS at 02:17

## 2024-04-16 NOTE — THERAPY EVALUATION
Acute Care - Speech Language Pathology   Swallow Initial Evaluation [facility]     Patient Name: Jamshid Carrero  : 1960  MRN: [id number]  Today's Date: 2024               Admit Date: 4/15/2024    Visit Dx:     ICD-10-CM ICD-9-CM   1. Moderate vascular dementia with agitation  F01.B11 290.40   2. Oropharyngeal dysphagia  R13.12 787.22     Patient Active Problem List   Diagnosis    Antifreeze poisoning    CVA, old, dysarthria    Hyperlipidemia    Elevated glucose    Dysarthria    Essential hypertension    Junctional bradycardia    Movement disorder    Cerebrovascular accident (CVA)    Anxiety    Dementia with behavioral disturbance    Irritability and anger    Physical deconditioning     Past Medical History:   Diagnosis Date    Dementia     Hyperlipidemia     Stroke      History reviewed. No pertinent surgical history.    SLP Recommendation and Plan             Inpatient Speech Pathology Dysphagia Evaluation        PAIN SCALE: None indicated    PRECAUTIONS/CONTRAINDICATIONS: Standard, fall    SUSPECTED ABUSE/NEGLECT/EXPLOITATION: None identified    SOCIAL/PSYCHOLOGICAL NEEDS/BARRIERS: None identified    PAST SOCIAL HISTORY: 63-year-old male, had been living at home with his family    PRIOR FUNCTION: Currently n.p.o.    PATIENT GOALS/EXPECTATIONS: Patient wants to eat and drink    HISTORY: 63-year-old male admitted to [facility] on 4/15/2024 secondary to physical deconditioning.  Patient with history of CVA.  Family reportedly wishing placement as they are unable to care for him properly at home.    CURRENT DIET LEVEL: N.p.o.    OBJECTIVE:    TEST ADMINISTERED: Clinical dysphagia evaluation    COGNITION/SAFETY AWARENESS: Not thoroughly evaluated    BEHAVIORAL OBSERVATIONS: Awake, cooperative    ORAL MOTOR EXAM: Dry oral mucosa, natural dentition however several missing and of poor quality.  Lingual protrusion with slight deviation to the left.  Left-sided facial symmetry.    VOICE QUALITY:  Clear    REFLEX EXAM: Nonproductive    POSTURE: Total assist    FEEDING/SWALLOWING FUNCTION: Assessed with thin liquids, nectar thick liquids, purée solids, regular soft solids    CLINICAL OBSERVATIONS: Nectar thick liquids by spoon, control cup drink and controlled straw drink.  Swallows completed with mild delay.  Vocal quality clear and laryngeal sounds clear with cervical auscultation.  Thin liquids by spoon, control cup drink and controlled straw drink.  Swallows completed with mild delay.  Vocal quality clear laryngeal sounds clear with cervical auscultation.  Purée solids by spoon.  Swallow completed.  Regular soft solids.  Chewing extension.  Swallow completed.  Left-sided buccal residue requiring moderate cues for lingual sweep to clear.  Laryngeal elevation noted palpation.  Patient exhibiting mild swallow delay with consistencies at bedside.  No overt signs or symptoms of aspiration observed however cannot rule out silent aspiration.    DYSPHAGIA CRITERIA: Risk of aspiration    FUNCTIONAL ASSESSMENT INSTRUMENT: Patient currently scored a level 5 of 7 on Functional Communication Measures for swallowing indicating a 20-39% limitation in function.    ASSESSMENT/ PLAN OF CARE:  Pt presents with limitations, noted below, that impede patient's ability to write least restrictive diet safely and independently. The skills of a therapist will be required to safely and effectively implement the following treatment plan to restore maximal level of function.    PROBLEMS:  1.  Risk of aspiration, swallow delay   LTG 1: 30 days.  Patient will increase functional communication measures for swallowing to level 6 of 7, indicating a 1-19% limitation in function.   STG 1a: 14 days.  Patient will tolerate least restrictive diet mechanical soft solids, thin liquids with minimal to no signs or symptoms of aspiration.   STG 1b: 14 days.  Patient will tolerate least restrictive diet and mechanical soft solids, thin liquids  utilizing strategies with moderate cueing.   TREATMENT: Speech therapy for dysphagia, education of strategies and tolerance of least restrictive diet.      FREQUENCY/DURATION: Daily, 5 days a week    REHAB POTENTIAL:  Pt has good rehab potential.  The following limitations may influence improvement/ length of tx medical status.    RECOMMENDATIONS:   1.   DIET: Mechanical soft solids, thin liquids    2.  POSITION: Fully upright for all p.o. intake, 30 minutes following    3.  COMPENSATORY STRATEGIES: Assist for feeding, small bites and sips, meds whole in applesauce    Pt/responsible party agrees with plan of care and has been informed of all alternatives, risks and benefits.                 Anticipated Discharge Disposition (SLP): anticipate therapy at next level of care (04/16/24 0857)                                                               EDUCATION  The patient has been educated in the following areas:   Dysphagia (Swallowing Impairment).              Time Calculation:    Time Calculation- SLP       Row Name 04/16/24 0857             Time Calculation- SLP    SLP Start Time 0730  -SN      SLP Stop Time 0830  -SN      SLP Time Calculation (min) 60 min  -SN      SLP Received On 04/16/24  -SN         Untimed Charges    43752-[id number] Eval Oral Pharyng Swallow Minutes 60  -SN         Total Minutes    Untimed Charges Total Minutes 60  -SN       Total Minutes 60  -SN                User Key  (r) = Recorded By, (t) = Taken By, (c) = Cosigned By      Initials Name Provider Type    Jazzy Webster MS-CCC/SLP, SHELBY Speech and Language Pathologist                    Therapy Charges for Today       Code Description Service Date Service Provider Modifiers Qty    [id number] HC ST EVAL ORAL PHARYNG SWALLOW 4 4/16/2024 Jazzy Torres MS-CCC/SLPSHELBY GN 1                 ALBERTO Fine/SLP, CNT  4/16/2024

## 2024-04-16 NOTE — SIGNIFICANT NOTE
04/16/24 1854   Plan   Plan Comments SW was contacted by patient's sister, Radha and she wanted to let  know that she has gotten another caregiver for brother who he will going to live with that sister has arranged for him. The caregiver's name is Yajaira. Sister went to look at patient's room at the new home and will plan to get bed that Medicaid provided for him and all his belongings from previous home to move to new home. Sister plans to contact  in the morning as well.
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
## 2024-04-16 NOTE — PLAN OF CARE
Goal Outcome Evaluation:      ASSESSMENT/ PLAN OF CARE:  Pt presents with limitations, noted below, that impede patient's ability to write least restrictive diet safely and independently. The skills of a therapist will be required to safely and effectively implement the following treatment plan to restore maximal level of function.    PROBLEMS:  1.  Risk of aspiration, swallow delay   TREATMENT: Speech therapy for dysphagia, education of strategies and tolerance of least restrictive diet.      FREQUENCY/DURATION: Daily, 5 days a week    REHAB POTENTIAL:  Pt has good rehab potential.  The following limitations may influence improvement/ length of tx medical status.    RECOMMENDATIONS:   1.   DIET: Mechanical soft solids, thin liquids    2.  POSITION: Fully upright for all p.o. intake, 30 minutes following    3.  COMPENSATORY STRATEGIES: Assist for feeding, small bites and sips, meds whole in applesauce               Anticipated Discharge Disposition (SLP): anticipate therapy at next level of care

## 2024-04-16 NOTE — CONSULTS
"The patient is on 4NT, I read the unit SW notes and placed a call to the daughter Dagmar as we were consulted for goals of care discussion and to discuss [facility]. Dagmar reported that she is currently at work and gets off at 530. I informed her of my hours and she then asked if I could call her back at 4 pm.    Update: I spoke with the daughter Dagmar who reports she has not had contact with the patient since Oct. And was only asked two days ago to allow her dad to live with her. She was very emotional and stated she has a mortgage and needs to work full time. Dagmar states ,\"he has 13 brothers and sister and they don't work.\" Dagmar repeated several times that she was only given a two day notice. When I also stated I was going to talk with her about a [facility] referral she stated that she would ask her ex if he would help her now that she knows he may not have long to live. We planned for me to call her tomorrow at 1 pm to get her decision if she could take her dad back to her house or not.    -Mi HASSAN, RN, PN   Palliative Care    4/16/2024 14:34 EDT    "
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
## 2024-04-16 NOTE — ED NOTES
Pt wouldn't let staff do EKG.  Pt was asked for a urine sample and given urinal, pt stated that he wasn't able to pee.

## 2024-04-16 NOTE — PLAN OF CARE
Goal Outcome Evaluation:      Pt A/Ox3, cooperative and pleasant on arrival to floor. IV fluids running at 75mL/hr. NPO pt failed swallow screen in ED, eager and asking for food. PW in place for incontinence

## 2024-04-16 NOTE — SIGNIFICANT NOTE
04/16/24 0931   OTHER   Discipline occupational therapist   Rehab Time/Intention   Session Not Performed patient unavailable for evaluation  (With PCA)

## 2024-04-16 NOTE — PLAN OF CARE
Goal Outcome Evaluation:   Call light within reach. No complaints at this time.
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
## 2024-04-17 PROCEDURE — 97165 OT EVAL LOW COMPLEX 30 MIN: CPT

## 2024-04-17 PROCEDURE — 99232 SBSQ HOSP IP/OBS MODERATE 35: CPT | Performed by: INTERNAL MEDICINE

## 2024-04-17 PROCEDURE — 25810000003 SODIUM CHLORIDE 0.9 % SOLUTION: Performed by: FAMILY MEDICINE

## 2024-04-17 RX ADMIN — Medication 2000 UNITS: at 09:17

## 2024-04-17 RX ADMIN — Medication 10 ML: at 09:17

## 2024-04-17 RX ADMIN — AMLODIPINE BESYLATE 2.5 MG: 5 TABLET ORAL at 09:17

## 2024-04-17 RX ADMIN — ASPIRIN 81 MG: 81 TABLET, COATED ORAL at 09:17

## 2024-04-17 RX ADMIN — OLANZAPINE 5 MG: 5 TABLET, ORALLY DISINTEGRATING ORAL at 22:01

## 2024-04-17 RX ADMIN — APIXABAN 2.5 MG: 2.5 TABLET, FILM COATED ORAL at 09:17

## 2024-04-17 RX ADMIN — SODIUM CHLORIDE 75 ML/HR: 9 INJECTION, SOLUTION INTRAVENOUS at 12:57

## 2024-04-17 RX ADMIN — SODIUM CHLORIDE 75 ML/HR: 9 INJECTION, SOLUTION INTRAVENOUS at 02:15

## 2024-04-17 RX ADMIN — Medication 10 ML: at 22:01

## 2024-04-17 RX ADMIN — CLOPIDOGREL BISULFATE 75 MG: 75 TABLET ORAL at 09:17

## 2024-04-17 RX ADMIN — APIXABAN 2.5 MG: 2.5 TABLET, FILM COATED ORAL at 22:01

## 2024-04-17 RX ADMIN — ATORVASTATIN CALCIUM 40 MG: 40 TABLET, FILM COATED ORAL at 22:01

## 2024-04-17 NOTE — PROGRESS NOTES
[facility]   Hospitalist Progress Note    Date of admission: 4/15/2024  Patient Name: Jamshid Carrero  1960  Date: 4/17/2024      Subjective     Chief Complaint   Patient presents with    APS CASE       Interval Followup:   Patient remains confused but currently calm. Eating lunch. No distress noted       Objective     Vitals:   Temp:  [97.1 °F (36.2 °C)-97.9 °F (36.6 °C)] 97.9 °F (36.6 °C)  Heart Rate:  [50-84] 84  Resp:  [16-18] 16  BP: (118-148)/(82-93) 138/90    Physical Exam  Chronically ill-appearing in bed tired  CTAB  RRR  Abdomen soft  Not acutely agitated, in bed eating lunch     Result Review:  Vital signs, labs and recent relevant imaging reviewed.      CBC          4/10/2024    11:22 4/15/2024    19:32 4/16/2024    05:19   CBC   WBC 4.91  5.07  5.62    RBC 5.07  4.99  4.94    Hemoglobin 15.3  15.0  14.5    Hematocrit 45.2  45.1  45.8    MCV 89.2  90.4  92.7    MCH 30.2  30.1  29.4    MCHC 33.8  33.3  31.7    RDW 13.6  13.5  13.4    Platelets 203  187  183      CMP          4/10/2024    11:22 4/15/2024    19:32 4/16/2024    05:19   CMP   Glucose 89  82  76    BUN 18  23  21    Creatinine 0.97  0.93  0.76    EGFR 88.8  93.4  102.3    Sodium 142  143  141    Potassium 4.1  3.9  3.7    Chloride 106  107  107    Calcium 9.5  8.9  8.8    Total Protein 6.7  6.6  6.3    Albumin 4.1  4.1  3.8    Globulin 2.6  2.5  2.5    Total Bilirubin 0.5  0.5  0.6    Alkaline Phosphatase 99  90  80    AST (SGOT) 18  22  23    ALT (SGPT) 11  18  17    Albumin/Globulin Ratio 1.6  1.6  1.5    BUN/Creatinine Ratio 18.6  24.7  27.6    Anion Gap 10.9  9.8  11.5          senna-docusate sodium **AND** polyethylene glycol **AND** bisacodyl **AND** bisacodyl    haloperidol lactate    Insert peripheral IV **AND** sodium chloride    sodium chloride    sodium chloride    amLODIPine, 2.5 mg, Oral, Daily  apixaban, 2.5 mg, Oral, Q12H  aspirin, 81 mg, Oral, Daily  atorvastatin, 40 mg, Oral, Nightly  cholecalciferol, 2,000 Units,  Oral, Daily  clopidogrel, 75 mg, Oral, Daily  OLANZapine zydis, 5 mg, Oral, Nightly  sodium chloride, 10 mL, Intravenous, Q12H        CT Head Without Contrast    Result Date: 4/15/2024  Impression: Limited evaluation due to motion. Volume loss more than expected for patient's age moderate in degree. A large amount of low-density periventricular and subcortical white matter. This is nonspecific but most commonly seen with chronic some vessel ischemic change. Remote lacunar infarcts as described. Brain MRI is more sensitive to evaluate for acute or subacute infarct.    Electronically Signed By-Peyton Momin MD On:4/15/2024 8:07 PM      XR Chest 1 View    Result Date: 4/10/2024  Impression: 1.  No acute cardiopulmonary disease   Electronically Signed By-Hung Camarillo MD On:4/10/2024 12:33 PM       Assessment / Plan     Summary: 63-year-old male with vascular dementia, advanced, brought in by family as they can no longer take care of him, apparently had been in a nursing home and one of the patient's sisters had signed him out but now no longer willing to take care of him and declined to pick him up from the hospital.  APS/suspect will be difficult for placement    Assessment/Plan (clinically significant if listed here)  Advanced vascular dementia with intermittent behavioral disturbances  Deconditioning  APS concerns  History of CVA  Dyslipidemia  Hypertension    Continue home Zyprexa   Continue home amlodipine  Continue home aspirin and statin and Plavix for CVA history  As needed pain medication  External urinary catheter for now  Use Eliquis for DVT prophylaxis to minimize agitation  Social work/HPI/placement difficulties  Head imaging limited by motion volume loss greater than expected for patient's age with chronic vessel ischemic changes and remote lacunar infarcts consistent with patient's vascular dementia history and advanced state  PT/OT  Continue hospitalization at current level of care.  Suspect will have a  prolonged hospitalization and difficulties with placement        DVT prophylaxis:  Medical DVT prophylaxis orders are present.      Code Status (Patient has no pulse and is not breathing): CPR (Attempt to Resuscitate)  Medical Interventions (Patient has pulse or is breathing): Full Support

## 2024-04-17 NOTE — THERAPY EVALUATION
Patient Name: Jamshid Carrero  : 1960    MRN: [id number]                              Today's Date: 2024       Admit Date: 4/15/2024    Visit Dx:     ICD-10-CM ICD-9-CM   1. Moderate vascular dementia with agitation  F01.B11 290.40   2. Oropharyngeal dysphagia  R13.12 787.22   3. Decreased activities of daily living (ADL)  Z78.9 V49.89     Patient Active Problem List   Diagnosis    Antifreeze poisoning    CVA, old, dysarthria    Hyperlipidemia    Elevated glucose    Dysarthria    Essential hypertension    Junctional bradycardia    Movement disorder    Cerebrovascular accident (CVA)    Anxiety    Dementia with behavioral disturbance    Irritability and anger    Physical deconditioning     Past Medical History:   Diagnosis Date    Dementia     Hyperlipidemia     Stroke      History reviewed. No pertinent surgical history.   General Information       Row Name 24 1034          OT Time and Intention    Document Type evaluation  -AV     Mode of Treatment individual therapy;occupational therapy  -AV       Row Name 24 1034          General Information    Patient Profile Reviewed yes  -AV     Prior Level of Function --  Per EMR, patient required assistance for ADLs at baseline-specific levels unknown.  Ambulates with walker.  No home oxygen.  -AV     Existing Precautions/Restrictions fall  Mechanical ground diet/thin liquids  -AV     Barriers to Rehab cognitive status;previous functional deficit  -AV       Row Name 24 1034          Occupational Profile    Reason for Services/Referral (Occupational Profile) Patient is a 63 year old male admitted to [facility] on April 15, 2024.  He is currently on [address] [place]/room air.   OT consulted due to impaired ADL/transfer independence.  No previous OT services for current condition.  -AV       Row Name 24 1034          Living Environment    People in Home other (see comments)  Per EMR, patient had been living in a group home and  "transitioned to live with a caregiver in [facility].  -AV       [facility] Name 04/17/24 1034          Cognition    Orientation Status (Cognition) --  Alert.  Able to follow commands when desired.  Impaired attention to task.  Impaired insight/safety awareness.  Dementia  -AV       [facility] Name 04/17/24 1034          Safety Issues, Functional Mobility    Impairments Affecting Function (Mobility) balance;cognition;endurance/activity tolerance  -AV               User Key  (r) = Recorded By, (t) = Taken By, (c) = Cosigned By      Initials Name Provider Type    Raul Ball OT Occupational Therapist                     Mobility/ADL's       [facility] Name 04/17/24 1037          Transfers    Comment, (Transfers) Supine to long sitting minimal assistance.  Adamantly declined further transfers despite encouragement stating, \"I'm watchin TV\"  -AV       [facility] Name 04/17/24 1037          Activities of Daily Living    BADL Assessment/Intervention --  Independent feeding with set up.  Moderate assistance further ADLs with set up and cues.  -AV               User Key  (r) = Recorded By, (t) = Taken By, (c) = Cosigned By      Initials Name Provider Type    Raul Ball OT Occupational Therapist                   Obj/Interventions       [facility] Name 04/17/24 1039          Sensory Assessment (Somatosensory)    Sensory Assessment (Somatosensory) UE sensation intact  -AV     Sensory Assessment Sensory awareness to light touch intact bilateral upper extremities  -AV       [facility] Name 04/17/24 1039          Vision Assessment/Intervention    Visual Impairment/Limitations WFL  -AV       Row Name 04/17/24 1039          Range of Motion Comprehensive    General Range of Motion bilateral upper extremity ROM WFL  -AV     Comment, General Range of Motion AROM  -AV       [facility] Name 04/17/24 1039          Strength Comprehensive (MMT)    Comment, General Manual Muscle Testing (MMT) Assessment Upper extremities equal at 5/5  -[facility] Name 04/17/24 1039    " "      Motor Skills    Motor Skills coordination;functional endurance  -AV     Coordination --  Right dominant-declined testing  -AV     Functional Endurance Fair minus  -AV       Row Name 04/17/24 1039          Balance    Comment, Balance Long sitting independent.  Declined further testing.  -AV               User Key  (r) = Recorded By, (t) = Taken By, (c) = Cosigned By      Initials Name Provider Type    AV Raul Solomon, OT Occupational Therapist                   Goals/Plan    No documentation.                  Clinical Impression       Row Name 04/17/24 1040          Pain Assessment    Pretreatment Pain Rating 0/10 - no pain  -AV     Posttreatment Pain Rating 0/10 - no pain  -AV       Row Name 04/17/24 1040          Plan of Care Review    Plan of Care Reviewed With patient  -AV     Progress no change  -AV     Outcome Evaluation Patient is not appropriate for skilled OT services at this time due to decreased cooperation and ability to actively engage.  At session end, he also stated \"I don't want Occupational Therapy\".  Recommend return home with 24-hour care or long-term care placement with subacute rehab services as tolerated for transitional ADLs in new home environment and development of restorative program.  -AV       Row Name 04/17/24 1040          Therapy Assessment/Plan (OT)    Patient/Family Therapy Goal Statement (OT) NA  -AV     Rehab Potential (OT) --  NA  -AV     Criteria for Skilled Therapeutic Interventions Met (OT) does not meet criteria for skilled intervention  -AV     Therapy Frequency (OT) evaluation only  -AV       Row Name 04/17/24 1040          Therapy Plan Review/Discharge Plan (OT)    Anticipated Discharge Disposition (OT) home with 24/7 care;extended care facility  -AV       Row Name 04/17/24 1040          Vital Signs    O2 Delivery Pre Treatment room air  -AV     O2 Delivery Intra Treatment room air  -AV     O2 Delivery Post Treatment room air  -AV       Row Name 04/17/24 1040       "    Positioning and Restraints    Pre-Treatment Position in bed  -AV     Post Treatment Position bed  -AV     In Bed call light within reach;encouraged to call for assist;exit alarm on  -AV               User Key  (r) = Recorded By, (t) = Taken By, (c) = Cosigned By      Initials Name Provider Type    Raul Ball, CELE Occupational Therapist                   Outcome Measures       Row Name 04/17/24 1043          How much help from another is currently needed...    Putting on and taking off regular lower body clothing? 2  -AV     Bathing (including washing, rinsing, and drying) 2  -AV     Toileting (which includes using toilet bed pan or urinal) 2  -AV     Putting on and taking off regular upper body clothing 2  -AV     Taking care of personal grooming (such as brushing teeth) 2  -AV     Eating meals 4  -AV     AM-PAC 6 Clicks Score (OT) 14  -AV       Row Name 04/17/24 0740          How much help from another person do you currently need...    Turning from your back to your side while in flat bed without using bedrails? 4  -BB     Moving from lying on back to sitting on the side of a flat bed without bedrails? 3  -BB     Moving to and from a bed to a chair (including a wheelchair)? 3  -BB     Standing up from a chair using your arms (e.g., wheelchair, bedside chair)? 2  -BB     Climbing 3-5 steps with a railing? 1  -BB     To walk in hospital room? 2  -BB     AM-PAC 6 Clicks Score (PT) 15  -BB     Highest Level of Mobility Goal 4 --> Transfer to chair/commode  -BB       Row Name 04/17/24 1043          Functional Assessment    Outcome Measure Options AM-PAC 6 Clicks Daily Activity (OT);Optimal Instrument  -AV       Row Name 04/17/24 1043          Optimal Instrument    Optimal Instrument Optimal - 3  -AV     Bending/Stooping 2  -AV     Standing 5  -AV     Reaching 1  -AV     From the list, choose the 3 activities you would most like to be able to do without any difficulty Bending/stooping;Standing;Reaching  -AV      "Total Score Optimal - 3 8  -AV               User Key  (r) = Recorded By, (t) = Taken By, (c) = Cosigned By      Initials Name Provider Type    AV Raul Solomon OT Occupational Therapist    Aissatou Garcia RN Registered Nurse                    Occupational Therapy Education       Title: PT OT SLP Therapies (Done)       Topic: Occupational Therapy (Done)       Point: ADL training (Done)       Description:   Instruct learner(s) on proper safety adaptation and remediation techniques during self care or transfers.   Instruct in proper use of assistive devices.                  Learning Progress Summary             Patient Nonacceptance, E, VU by AV at 4/17/2024 1044    Comment: DC OT services                                         User Key       Initials Effective Dates Name Provider Type Discipline     06/16/21 -  Raul Solomon OT Occupational Therapist OT                  OT Recommendation and Plan  Therapy Frequency (OT): evaluation only  Plan of Care Review  Plan of Care Reviewed With: patient  Progress: no change  Outcome Evaluation: Patient is not appropriate for skilled OT services at this time due to decreased cooperation and ability to actively engage.  At session end, he also stated \"I don't want Occupational Therapy\".  Recommend return home with 24-hour care or long-term care placement with subacute rehab services as tolerated for transitional ADLs in new home environment and development of restorative program.     Time Calculation:   Evaluation Complexity (OT)  Review Occupational Profile/Medical/Therapy History Complexity: expanded/moderate complexity  Assessment, Occupational Performance/Identification of Deficit Complexity: 3-5 performance deficits  Clinical Decision Making Complexity (OT): problem focused assessment/low complexity  Overall Complexity of Evaluation (OT): low complexity     Time Calculation- OT       Row Name 04/17/24 1045             Time Calculation- OT    OT Received " On 04/17/24  -AV         Untimed Charges    OT Eval/Re-eval Minutes 35  -AV         Total Minutes    Untimed Charges Total Minutes 35  -AV       Total Minutes 35  -AV                User Key  (r) = Recorded By, (t) = Taken By, (c) = Cosigned By      Initials Name Provider Type    Raul Ball OT Occupational Therapist                  Therapy Charges for Today       Code Description Service Date Service Provider Modifiers Qty    03643117296 HC OT EVAL LOW COMPLEXITY 3 4/17/2024 Raul Solomon OT GO 1                 Raul Solomon OT  4/17/2024

## 2024-04-17 NOTE — PLAN OF CARE
"Goal Outcome Evaluation:  Plan of Care Reviewed With: patient        Progress: no change  Outcome Evaluation: Patient is not appropriate for skilled OT services at this time due to decreased cooperation and ability to actively engage.  At session end, he also stated \"I don't want Occupational Therapy\".  Recommend return home with 24-hour care or long-term care placement with subacute rehab services as tolerated for transitional ADLs in new home environment and development of restorative program.      Anticipated Discharge Disposition (OT): home with 24/7 care, extended care facility                        "

## 2024-04-17 NOTE — PROGRESS NOTES
[facility]   Hospitalist Progress Note    Date of admission: 4/15/2024  Patient Name: Jamshid Carrero  1960  Date: 4/16/2024      Subjective     Chief Complaint   Patient presents with   • APS CASE       Interval Followup: Confused not able to provide any adequate history or ROS  Required IM [place] last night at 7:15 PM    Objective     Vitals:   Temp:  [97.1 °F (36.2 °C)-97.8 °F (36.6 °C)] 97.7 °F (36.5 °C)  Heart Rate:  [46-76] 76  Resp:  [18-20] 18  BP: (121-148)/(63-93) 148/85    Physical Exam  Chronically ill-appearing in bed tired  CTAB  RRR  Abdomen soft  Not acutely agitated, in bed    Result Review:  Vital signs, labs and recent relevant imaging reviewed.      CBC          4/10/2024    11:22 4/15/2024    19:32 4/16/2024    05:19   CBC   WBC 4.91  5.07  5.62    RBC 5.07  4.99  4.94    Hemoglobin 15.3  15.0  14.5    Hematocrit 45.2  45.1  45.8    MCV 89.2  90.4  92.7    MCH 30.2  30.1  29.4    MCHC 33.8  33.3  31.7    RDW 13.6  13.5  13.4    Platelets 203  187  183      CMP          4/10/2024    11:22 4/15/2024    19:32 4/16/2024    05:19   CMP   Glucose 89  82  76    BUN 18  23  21    Creatinine 0.97  0.93  0.76    EGFR 88.8  93.4  102.3    Sodium 142  143  141    Potassium 4.1  3.9  3.7    Chloride 106  107  107    Calcium 9.5  8.9  8.8    Total Protein 6.7  6.6  6.3    Albumin 4.1  4.1  3.8    Globulin 2.6  2.5  2.5    Total Bilirubin 0.5  0.5  0.6    Alkaline Phosphatase 99  90  80    AST (SGOT) 18  22  23    ALT (SGPT) 11  18  17    Albumin/Globulin Ratio 1.6  1.6  1.5    BUN/Creatinine Ratio 18.6  24.7  27.6    Anion Gap 10.9  9.8  11.5        •  senna-docusate sodium **AND** polyethylene glycol **AND** bisacodyl **AND** bisacodyl  •  haloperidol lactate  •  Insert peripheral IV **AND** sodium chloride  •  sodium chloride  •  sodium chloride    apixaban, 2.5 mg, Oral, Q12H  risperiDONE, 0.5 mg, Oral, Nightly  sodium chloride, 10 mL, Intravenous, Q12H        CT Head Without Contrast    Result  Date: 4/15/2024  Impression: Limited evaluation due to motion. Volume loss more than expected for patient's age moderate in degree. A large amount of low-density periventricular and subcortical white matter. This is nonspecific but most commonly seen with chronic some vessel ischemic change. Remote lacunar infarcts as described. Brain MRI is more sensitive to evaluate for acute or subacute infarct.    Electronically Signed By-Peyton Momin MD On:4/15/2024 8:07 PM      XR Chest 1 View    Result Date: 4/10/2024  Impression: 1.  No acute cardiopulmonary disease   Electronically Signed By-Hung Camarillo MD On:4/10/2024 12:33 PM       Assessment / Plan     Summary: 63-year-old male with vascular dementia, advanced, brought in by family as they can no longer take care of him, apparently had been in a nursing home and one of the patient's sisters had signed him out but now no longer willing to take care of him and declined to pick him up from the hospital.  APS/suspect will be difficult for placement    Assessment/Plan (clinically significant if listed here)  Advanced vascular dementia with intermittent behavioral disturbances  Deconditioning  APS concerns  History of CVA  Dyslipidemia  Hypertension    Continue home Zyprexa at night at night to try and avoid IV as needed medications and improve regimen  May warrant additional medication/titration for symptoms.  Continue home amlodipine  Continue home aspirin and statin and Plavix for CVA history  As needed pain medication  External urinary catheter for now  Use Eliquis for DVT prophylaxis to minimize agitation  Social work/HPI/placement difficulties  Head imaging limited by motion volume loss greater than expected for patient's age with chronic vessel ischemic changes and remote lacunar infarcts consistent with patient's vascular dementia history and advanced state  PT/OT  Continue hospitalization at current level of care.  Suspect will have a prolonged hospitalization and  difficulties with placement        DVT prophylaxis:  Medical DVT prophylaxis orders are present.      Code Status (Patient has no pulse and is not breathing): CPR (Attempt to Resuscitate)  Medical Interventions (Patient has pulse or is breathing): Full Support

## 2024-04-17 NOTE — PLAN OF CARE
Goal Outcome Evaluation:             Pt A/Ox2-3, cooperative. IV fluids running at 75mL/hr. Using urinal.                                 Please send this letter to the patient.

## 2024-04-17 NOTE — PLAN OF CARE
Goal Outcome Evaluation:  Plan of Care Reviewed With: patient        Progress: no change  Outcome Evaluation: Pt had no acute changes noted this shift. No c/o pain or SOA. Pt is in no apparent distress at this time, denies any needs currently, call light in reach.

## 2024-04-17 NOTE — NURSING NOTE
Palliative care reached out to the daughter however she was with a Customer and asked that I call her back. Unit SW offered to place this call to help answer all questions and better plan for a DC as she has been speaking with the sister.    -Mi HASSAN, RN, [facility]   Palliative Care    4/17/2024 13:48 EDT

## 2024-04-17 NOTE — SIGNIFICANT NOTE
04/17/24 1059   Physical Therapy Time and Intention   Session Not Performed patient/family declined evaluation   Comment, Session Not Performed Pt reports already got up and moved today, too tired, maybe tomorrow.

## 2024-04-18 PROCEDURE — 99232 SBSQ HOSP IP/OBS MODERATE 35: CPT | Performed by: INTERNAL MEDICINE

## 2024-04-18 PROCEDURE — 92526 ORAL FUNCTION THERAPY: CPT

## 2024-04-18 RX ADMIN — APIXABAN 2.5 MG: 2.5 TABLET, FILM COATED ORAL at 20:43

## 2024-04-18 RX ADMIN — Medication 10 ML: at 08:30

## 2024-04-18 RX ADMIN — ATORVASTATIN CALCIUM 40 MG: 40 TABLET, FILM COATED ORAL at 20:43

## 2024-04-18 RX ADMIN — AMLODIPINE BESYLATE 2.5 MG: 5 TABLET ORAL at 08:29

## 2024-04-18 RX ADMIN — Medication 2000 UNITS: at 08:30

## 2024-04-18 RX ADMIN — OLANZAPINE 5 MG: 5 TABLET, ORALLY DISINTEGRATING ORAL at 20:42

## 2024-04-18 RX ADMIN — Medication 10 ML: at 20:43

## 2024-04-18 RX ADMIN — ASPIRIN 81 MG: 81 TABLET, COATED ORAL at 08:29

## 2024-04-18 RX ADMIN — CLOPIDOGREL BISULFATE 75 MG: 75 TABLET ORAL at 08:30

## 2024-04-18 RX ADMIN — APIXABAN 2.5 MG: 2.5 TABLET, FILM COATED ORAL at 08:30

## 2024-04-18 NOTE — PLAN OF CARE
Goal Outcome Evaluation: patient sle[pt on and off through out the shift awakens when spoken to and responds appropriately to questions asked, turned and repositioned. Good oral intake noted.

## 2024-04-18 NOTE — THERAPY TREATMENT NOTE
Acute Care - Speech Language Pathology   Swallow Treatment Note [facility]     Patient Name: Jamshid Carrero  : 1960  MRN: [id number]  Today's Date: 2024               Admit Date: 4/15/2024    Visit Dx:     ICD-10-CM ICD-9-CM   1. Moderate vascular dementia with agitation  F01.B11 290.40   2. Oropharyngeal dysphagia  R13.12 787.22   3. Decreased activities of daily living (ADL)  Z78.9 V49.89     Patient Active Problem List   Diagnosis    Antifreeze poisoning    CVA, old, dysarthria    Hyperlipidemia    Elevated glucose    Dysarthria    Essential hypertension    Junctional bradycardia    Movement disorder    Cerebrovascular accident (CVA)    Anxiety    Dementia with behavioral disturbance    Irritability and anger    Physical deconditioning     Past Medical History:   Diagnosis Date    Dementia     Hyperlipidemia     Stroke      History reviewed. No pertinent surgical history.    SLP Recommendation and Plan            SPEECH PATHOLOGY DYSPHAGIA TREATMENT    Subjective/Behavioral Observations: Awake and cooperative. Diet downgraded on 24 due to patient exhibiting difficulty with ground meat. Improved with pureed and patient able to feed self with assist.         Day/time of Treatment:24        Current Diet:pureed        Current Strategies:supervision/assist with meals        Treatment received:focused on tolerance of current diet recommendations and use of strategies        Results of treatment:Patient impulsive with self feeding, taking very large bites. This results in overfilling of oral cavity and coughing. Assisted with controlled bite/spoon size with no further s/s of aspiration. Hand over hand assist with straw drink of thin liquids. Single sips with no overt s/s of aspiration despite delayed swallow.         Progress toward goals: diet downgraded to pureed/tolerating well        Barriers to Achieving goals: medical status        Plan of care:/changes in plan: continue pureed diet,  assist with self feeding.                                                                                          EDUCATION  The patient has been educated in the following areas:   Dysphagia (Swallowing Impairment).              Time Calculation:    Time Calculation- SLP       Row Name 04/18/24 1350             Time Calculation- SLP    SLP Stop Time 1300  -SN      SLP Received On 04/18/24  -SN         Untimed Charges    70731-YP Treatment Swallow Minutes 45  -SN         Total Minutes    Untimed Charges Total Minutes 45  -SN       Total Minutes 45  -SN                User Key  (r) = Recorded By, (t) = Taken By, (c) = Cosigned By      Initials Name Provider Type    Jazzy Webster MS-CCC/SLP, SHELBY Speech and Language Pathologist                    Therapy Charges for Today       Code Description Service Date Service Provider Modifiers Qty    34668213507 HC ST TREATMENT SWALLOW 3 4/18/2024 Jazzy Torres MS-CCC/SLP, SHE[place]BY GN 1                 ALBERTO Fine/SLP, CNT  4/18/2024

## 2024-04-18 NOTE — PROGRESS NOTES
[facility]   Hospitalist Progress Note    Date of admission: 4/15/2024  Patient Name: Jamshid Carrero  1960  Date: 4/18/2024      Subjective     Chief Complaint   Patient presents with    APS CASE       Interval Followup:   Patient remains confused but currently calm. Eating lunch. No distress noted       Objective     Vitals:   Temp:  [97.3 °F (36.3 °C)-98.6 °F (37 °C)] 97.3 °F (36.3 °C)  Heart Rate:  [50-62] 62  Resp:  [18-20] 20  BP: (120-145)/(71-87) 120/87    Physical Exam  Chronically ill-appearing in bed tired  CTAB  RRR  Abdomen soft  Not acutely agitated, in bed eating lunch     Result Review:  Vital signs, labs and recent relevant imaging reviewed.      CBC          4/10/2024    11:22 4/15/2024    19:32 4/16/2024    05:19   CBC   WBC 4.91  5.07  5.62    RBC 5.07  4.99  4.94    Hemoglobin 15.3  15.0  14.5    Hematocrit 45.2  45.1  45.8    MCV 89.2  90.4  92.7    MCH 30.2  30.1  29.4    MCHC 33.8  33.3  31.7    RDW 13.6  13.5  13.4    Platelets 203  187  183      CMP          4/10/2024    11:22 4/15/2024    19:32 4/16/2024    05:19   CMP   Glucose 89  82  76    BUN 18  23  21    Creatinine 0.97  0.93  0.76    EGFR 88.8  93.4  102.3    Sodium 142  143  141    Potassium 4.1  3.9  3.7    Chloride 106  107  107    Calcium 9.5  8.9  8.8    Total Protein 6.7  6.6  6.3    Albumin 4.1  4.1  3.8    Globulin 2.6  2.5  2.5    Total Bilirubin 0.5  0.5  0.6    Alkaline Phosphatase 99  90  80    AST (SGOT) 18  22  23    ALT (SGPT) 11  18  17    Albumin/Globulin Ratio 1.6  1.6  1.5    BUN/Creatinine Ratio 18.6  24.7  27.6    Anion Gap 10.9  9.8  11.5          senna-docusate sodium **AND** polyethylene glycol **AND** bisacodyl **AND** bisacodyl    Insert peripheral IV **AND** sodium chloride    sodium chloride    sodium chloride    amLODIPine, 2.5 mg, Oral, Daily  apixaban, 2.5 mg, Oral, Q12H  aspirin, 81 mg, Oral, Daily  atorvastatin, 40 mg, Oral, Nightly  cholecalciferol, 2,000 Units, Oral, Daily  clopidogrel,  75 mg, Oral, Daily  OLANZapine zydis, 5 mg, Oral, Nightly  sodium chloride, 10 mL, Intravenous, Q12H        CT Head Without Contrast    Result Date: 4/15/2024  Impression: Limited evaluation due to motion. Volume loss more than expected for patient's age moderate in degree. A large amount of low-density periventricular and subcortical white matter. This is nonspecific but most commonly seen with chronic some vessel ischemic change. Remote lacunar infarcts as described. Brain MRI is more sensitive to evaluate for acute or subacute infarct.    Electronically Signed By-Peyton Momin MD On:4/15/2024 8:07 PM       Assessment / Plan     Summary: 63-year-old male with vascular dementia, advanced, brought in by family as they can no longer take care of him, apparently had been in a nursing home and one of the patient's sisters had signed him out but now no longer willing to take care of him and declined to pick him up from the hospital.  APS/suspect will be difficult for placement    Assessment/Plan (clinically significant if listed here)  Advanced vascular dementia with intermittent behavioral disturbances  Deconditioning  APS concerns  History of CVA  Dyslipidemia  Hypertension    Continue home Zyprexa   Continue home amlodipine. Bp remains stable   Continue home aspirin and statin and Plavix for CVA history  As needed pain medication  External urinary catheter for now  Use Eliquis for DVT prophylaxis   Social work/HPI/placement difficulties  Head imaging limited by motion volume loss greater than expected for patient's age with chronic vessel ischemic changes and remote lacunar infarcts consistent with patient's vascular dementia history and advanced state  PT/OT  Continue hospitalization at current level of care.  Suspect will have a prolonged hospitalization and difficulties with placement        DVT prophylaxis:  Medical DVT prophylaxis orders are present.      Code Status (Patient has no pulse and is not breathing):  CPR (Attempt to Resuscitate)  Medical Interventions (Patient has pulse or is breathing): Full Support

## 2024-04-18 NOTE — SIGNIFICANT NOTE
04/18/24 1400   Physical Therapy Time and Intention   Session Not Performed patient/family declined evaluation  (Pt reports he is too tired and does not feel like getting out of bed. States he wants to keep sleeping (would not open eyes).)   Progress Summary (PT)   Daily Progress Summary (PT) Recommend patient be transitioned to a LTC facility when medically ready. PT evaluation will be discharged at this time. Recommend he get up with staff for transfers and short ambulation distances if patient is willing.

## 2024-04-19 PROCEDURE — 92526 ORAL FUNCTION THERAPY: CPT

## 2024-04-19 PROCEDURE — 99232 SBSQ HOSP IP/OBS MODERATE 35: CPT | Performed by: INTERNAL MEDICINE

## 2024-04-19 RX ADMIN — APIXABAN 2.5 MG: 2.5 TABLET, FILM COATED ORAL at 22:14

## 2024-04-19 RX ADMIN — ATORVASTATIN CALCIUM 40 MG: 40 TABLET, FILM COATED ORAL at 22:14

## 2024-04-19 RX ADMIN — APIXABAN 2.5 MG: 2.5 TABLET, FILM COATED ORAL at 08:19

## 2024-04-19 RX ADMIN — OLANZAPINE 5 MG: 5 TABLET, ORALLY DISINTEGRATING ORAL at 22:14

## 2024-04-19 RX ADMIN — AMLODIPINE BESYLATE 2.5 MG: 5 TABLET ORAL at 08:19

## 2024-04-19 RX ADMIN — ASPIRIN 81 MG: 81 TABLET, COATED ORAL at 08:19

## 2024-04-19 RX ADMIN — Medication 2000 UNITS: at 08:19

## 2024-04-19 RX ADMIN — Medication 10 ML: at 08:22

## 2024-04-19 RX ADMIN — CLOPIDOGREL BISULFATE 75 MG: 75 TABLET ORAL at 08:19

## 2024-04-19 RX ADMIN — Medication 10 ML: at 22:14

## 2024-04-19 NOTE — CONSULTS
Discharge Planning Assessment   [facility]     Patient Name: Jamshid Carrero  MRN: [id number]  Today's Date: 4/19/2024    Admit Date: 4/15/2024       Discharge Plan       Row Name 04/19/24 1251       Plan    Plan SW received call from pt's sister, Radha. Radha asked that SW contact this new caregiver she found, Adis Hardin. States she is not wanting pt to be placed in a facility and feels Adis would take good care of pt. RUCHI contacted Adis to speak with her. Adis confirms that she has been in contact with Radha and is agreeable to taking care of pt. States she used to care for her own mother and has experience caring for individuals with dementia. RUCHI notified Adis that there was some aggressive behaviors noted from pt from his past caregiver as well as in the ED, however has been stable since being admitted to [facility]. Adis v/u. States she feels comfortable caring for pt at this time. Adis appropriate during conversation with RUCHI. States she would need pt's hospital bed and also requested HHC for therapy needs. RUCHI will send referrals, however Adis is aware his insurance may not cover for HHC. RUCHI notified Radha of this who asked that SW contact pt's previous caregiver, Kym, to see if she still has his bed. RUCHI contacted Kym who confirms she still has pt's belongings and reports she has been trying to get ahold of Radha. RUCHI notified Radha and asked her to contact [facility] to arrange for  of pt's belongings. Radha v/u. APS provided with an update. RUCHI attempted to contact pt's daughter, Dagmar, to discuss but received no answer- detailed voicemail left for returned call.    Patient/Family in Agreement with Plan yes             Tentative plans to discharge over the weekend with Adis Hardin-  [address] [place] [address] [phone]      NIKKIE Tellez

## 2024-04-19 NOTE — PLAN OF CARE
Goal Outcome Evaluation:  Plan of Care Reviewed With: patient        Progress: no change  Outcome Evaluation: pt aox2, disoriented to time and situation. no c/o pain. Vs stable. no new issues.

## 2024-04-19 NOTE — PLAN OF CARE
Goal Outcome Evaluation:   Patient cooperative with care, turned and repositioned, incontinence care provided. Appropriate to conversation.

## 2024-04-19 NOTE — THERAPY TREATMENT NOTE
Acute Care - Speech Language Pathology   Swallow Treatment Note [facility]     Patient Name: Jamshdi Carrero  : 1960  MRN: [id number]  Today's Date: 2024               Admit Date: 4/15/2024    Visit Dx:     ICD-10-CM ICD-9-CM   1. Moderate vascular dementia with agitation  F01.B11 290.40   2. Oropharyngeal dysphagia  R13.12 787.22   3. Decreased activities of daily living (ADL)  Z78.9 V49.89   4. Difficulty walking  R26.2 719.7     Patient Active Problem List   Diagnosis    Antifreeze poisoning    CVA, old, dysarthria    Hyperlipidemia    Elevated glucose    Dysarthria    Essential hypertension    Junctional bradycardia    Movement disorder    Cerebrovascular accident (CVA)    Anxiety    Dementia with behavioral disturbance    Irritability and anger    Physical deconditioning     Past Medical History:   Diagnosis Date    Dementia     Hyperlipidemia     Stroke      History reviewed. No pertinent surgical history.    SLP Recommendation and Plan         SPEECH PATHOLOGY DYSPHAGIA TREATMENT    Subjective/Behavioral Observations: Awake, cooperative        Day/time of Treatment: 2024        Current Diet: Puréed        Treatment received: Focused on dysphagia goals        Results of treatment: Patient feeding self with weighted utensils, improvement in decreased bolus size compared to previous treatment.  Patient however does require cues for slow rate and oral clearance.  Patient consumed 100% of his meal, 240 mL of thin liquids via controlled straw drink.  Patient did require hand overhand assist.  No overt signs or symptoms of aspiration observed at bedside treatment.        Progress toward goals: Progressing        Barriers to Achieving goals: Independent use of compensatory strategies        Plan of care:/changes in plan: Continue current diet, compensatory strategies and positioning.  Supervision with p.o. intake.                                                                                              EDUCATION  The patient has been educated in the following areas:   Dysphagia (Swallowing Impairment).              Time Calculation:    Time Calculation- SLP       Row Name 04/19/24 1258             Time Calculation- SLP    SLP Stop Time 1245  -SN      SLP Received On 04/19/24  -SN         Untimed Charges    41866-PO Treatment Swallow Minutes 45  -SN         Total Minutes    Untimed Charges Total Minutes 45  -SN       Total Minutes 45  -SN                User Key  (r) = Recorded By, (t) = Taken By, (c) = Cosigned By      Initials Name Provider Type    SN Jazzy Torres MS-CCC/SLP, SHELBY Speech and Language Pathologist                    Therapy Charges for Today       Code Description Service Date Service Provider Modifiers Qty    60807942796 HC ST TREATMENT SWALLOW 3 4/18/2024 Jazzy Torres MS-CCC/SLP, SHELBY GN 1    [id number] HC ST TREATMENT SWALLOW 3 4/19/2024 Jazzy Torres MS-CCC/SLP, SHELBY GN 1                 ALBERTO Fine/SLP, CNT  4/19/2024

## 2024-04-19 NOTE — PROGRESS NOTES
[facility]   Hospitalist Progress Note    Date of admission: 4/15/2024  Patient Name: Jamshid Carrero  1960  Date: 4/19/2024      Subjective     Chief Complaint   Patient presents with    APS CASE       Interval Followup:   Patient remains confused but currently calm. Eating lunch. No distress noted   Sister has arranged a caregiver       Objective     Vitals:   Temp:  [97.2 °F (36.2 °C)-98.2 °F (36.8 °C)] 97.2 °F (36.2 °C)  Heart Rate:  [50-57] 51  Resp:  [18-20] 18  BP: (118-146)/(65-91) 127/91    Physical Exam  Chronically ill-appearing in bed tired  CTAB  RRR  Abdomen soft  Not acutely agitated, in bed eating lunch     Result Review:  Vital signs, labs and recent relevant imaging reviewed.      CBC          4/10/2024    11:22 4/15/2024    19:32 4/16/2024    05:19   CBC   WBC 4.91  5.07  5.62    RBC 5.07  4.99  4.94    Hemoglobin 15.3  15.0  14.5    Hematocrit 45.2  45.1  45.8    MCV 89.2  90.4  92.7    MCH 30.2  30.1  29.4    MCHC 33.8  33.3  31.7    RDW 13.6  13.5  13.4    Platelets 203  187  183      CMP          4/10/2024    11:22 4/15/2024    19:32 4/16/2024    05:19   CMP   Glucose 89  82  76    BUN 18  23  21    Creatinine 0.97  0.93  0.76    EGFR 88.8  93.4  102.3    Sodium 142  143  141    Potassium 4.1  3.9  3.7    Chloride 106  107  107    Calcium 9.5  8.9  8.8    Total Protein 6.7  6.6  6.3    Albumin 4.1  4.1  3.8    Globulin 2.6  2.5  2.5    Total Bilirubin 0.5  0.5  0.6    Alkaline Phosphatase 99  90  80    AST (SGOT) 18  22  23    ALT (SGPT) 11  18  17    Albumin/Globulin Ratio 1.6  1.6  1.5    BUN/Creatinine Ratio 18.6  24.7  27.6    Anion Gap 10.9  9.8  11.5          senna-docusate sodium **AND** polyethylene glycol **AND** bisacodyl **AND** bisacodyl    [COMPLETED] Insert peripheral IV **AND** sodium chloride    sodium chloride    sodium chloride    amLODIPine, 2.5 mg, Oral, Daily  apixaban, 2.5 mg, Oral, Q12H  aspirin, 81 mg, Oral, Daily  atorvastatin, 40 mg, Oral,  Nightly  cholecalciferol, 2,000 Units, Oral, Daily  clopidogrel, 75 mg, Oral, Daily  OLANZapine zydis, 5 mg, Oral, Nightly  sodium chloride, 10 mL, Intravenous, Q12H        CT Head Without Contrast    Result Date: 4/15/2024  Impression: Limited evaluation due to motion. Volume loss more than expected for patient's age moderate in degree. A large amount of low-density periventricular and subcortical white matter. This is nonspecific but most commonly seen with chronic some vessel ischemic change. Remote lacunar infarcts as described. Brain MRI is more sensitive to evaluate for acute or subacute infarct.    Electronically Signed By-Peyton Momin MD On:4/15/2024 8:07 PM       Assessment / Plan     Summary: 63-year-old male with vascular dementia, advanced, brought in by family as they can no longer take care of him, apparently had been in a nursing home and one of the patient's sisters had signed him out but now no longer willing to take care of him and declined to pick him up from the hospital.  APS/suspect will be difficult for placement    Assessment/Plan (clinically significant if listed here)  Advanced vascular dementia with intermittent behavioral disturbances  Deconditioning  APS concerns  History of CVA  Dyslipidemia  Hypertension    Continue home Zyprexa   Continue home amlodipine. Bp remains stable   Continue home aspirin and statin and Plavix for CVA history  As needed pain medication  External urinary catheter for now  Use Eliquis for DVT prophylaxis   Social work/HPI/placement difficulties  Head imaging limited by motion volume loss greater than expected for patient's age with chronic vessel ischemic changes and remote lacunar infarcts consistent with patient's vascular dementia history and advanced state  PT/OT  Continue hospitalization at current level of care.  Suspect will have a prolonged hospitalization and difficulties with placement        DVT prophylaxis:  Medical DVT prophylaxis orders are  present.      Code Status (Patient has no pulse and is not breathing): CPR (Attempt to Resuscitate)  Medical Interventions (Patient has pulse or is breathing): Full Support

## 2024-04-20 PROCEDURE — 99232 SBSQ HOSP IP/OBS MODERATE 35: CPT | Performed by: INTERNAL MEDICINE

## 2024-04-20 RX ADMIN — APIXABAN 2.5 MG: 2.5 TABLET, FILM COATED ORAL at 21:13

## 2024-04-20 RX ADMIN — APIXABAN 2.5 MG: 2.5 TABLET, FILM COATED ORAL at 10:32

## 2024-04-20 RX ADMIN — ATORVASTATIN CALCIUM 40 MG: 40 TABLET, FILM COATED ORAL at 21:13

## 2024-04-20 RX ADMIN — CLOPIDOGREL BISULFATE 75 MG: 75 TABLET ORAL at 10:32

## 2024-04-20 RX ADMIN — Medication 10 ML: at 21:13

## 2024-04-20 RX ADMIN — Medication 2000 UNITS: at 10:32

## 2024-04-20 RX ADMIN — AMLODIPINE BESYLATE 2.5 MG: 5 TABLET ORAL at 10:32

## 2024-04-20 RX ADMIN — ASPIRIN 81 MG: 81 TABLET, COATED ORAL at 10:32

## 2024-04-20 RX ADMIN — OLANZAPINE 5 MG: 5 TABLET, ORALLY DISINTEGRATING ORAL at 21:13

## 2024-04-20 RX ADMIN — Medication 10 ML: at 10:32

## 2024-04-20 NOTE — PLAN OF CARE
Goal Outcome Evaluation:  Plan of Care Reviewed With: patient        Progress: no change  Outcome Evaluation: Pt is alert to self and place this shift. Pt denied pain/discomfort this shift. PT is currently resting with no apparent distress at this time, call light in reach. No new issues or new needs noted at this time.

## 2024-04-20 NOTE — PLAN OF CARE
"Goal Outcome Evaluation:  Plan of Care Reviewed With: patient        Progress: no change  Outcome Evaluation: pt aox2, to self and place. Pt has no complaints of pain or discomfort. Skin care provided. Pt resting in bed most of day stating, \"i just want to sleep.\" Pt just awaiting family to get bed and belongings from care giver to their home in order to discharge. Pt sister states she was hoping to have done today and would call when she was ready. No complaints at this time                               "

## 2024-04-20 NOTE — PROGRESS NOTES
[facility]   Hospitalist Progress Note    Date of admission: 4/15/2024  Patient Name: Jamshid Carrero  1960  Date: 4/20/2024      Subjective     Chief Complaint   Patient presents with    APS CASE       Interval Followup:   Patient remains confused but currently calm. Eating lunch. No distress noted   Sister has arranged a caregiver she is trying to get his hospital bed       Objective     Vitals:   Temp:  [97.3 °F (36.3 °C)-98.4 °F (36.9 °C)] 97.9 °F (36.6 °C)  Heart Rate:  [50-54] 52  Resp:  [18-20] 20  BP: (121-134)/(72-90) 121/72    Physical Exam  Chronically ill-appearing in bed tired  CTAB  RRR  Abdomen soft  Not acutely agitated, in bed eating lunch     Result Review:  Vital signs, labs and recent relevant imaging reviewed.      CBC          4/10/2024    11:22 4/15/2024    19:32 4/16/2024    05:19   CBC   WBC 4.91  5.07  5.62    RBC 5.07  4.99  4.94    Hemoglobin 15.3  15.0  14.5    Hematocrit 45.2  45.1  45.8    MCV 89.2  90.4  92.7    MCH 30.2  30.1  29.4    MCHC 33.8  33.3  31.7    RDW 13.6  13.5  13.4    Platelets 203  187  183      CMP          4/10/2024    11:22 4/15/2024    19:32 4/16/2024    05:19   CMP   Glucose 89  82  76    BUN 18  23  21    Creatinine 0.97  0.93  0.76    EGFR 88.8  93.4  102.3    Sodium 142  143  141    Potassium 4.1  3.9  3.7    Chloride 106  107  107    Calcium 9.5  8.9  8.8    Total Protein 6.7  6.6  6.3    Albumin 4.1  4.1  3.8    Globulin 2.6  2.5  2.5    Total Bilirubin 0.5  0.5  0.6    Alkaline Phosphatase 99  90  80    AST (SGOT) 18  22  23    ALT (SGPT) 11  18  17    Albumin/Globulin Ratio 1.6  1.6  1.5    BUN/Creatinine Ratio 18.6  24.7  27.6    Anion Gap 10.9  9.8  11.5          senna-docusate sodium **AND** polyethylene glycol **AND** bisacodyl **AND** bisacodyl    [COMPLETED] Insert peripheral IV **AND** sodium chloride    sodium chloride    sodium chloride    amLODIPine, 2.5 mg, Oral, Daily  apixaban, 2.5 mg, Oral, Q12H  aspirin, 81 mg, Oral,  Daily  atorvastatin, 40 mg, Oral, Nightly  cholecalciferol, 2,000 Units, Oral, Daily  clopidogrel, 75 mg, Oral, Daily  OLANZapine zydis, 5 mg, Oral, Nightly  sodium chloride, 10 mL, Intravenous, Q12H        CT Head Without Contrast    Result Date: 4/15/2024  Impression: Limited evaluation due to motion. Volume loss more than expected for patient's age moderate in degree. A large amount of low-density periventricular and subcortical white matter. This is nonspecific but most commonly seen with chronic some vessel ischemic change. Remote lacunar infarcts as described. Brain MRI is more sensitive to evaluate for acute or subacute infarct.    Electronically Signed By-Peyton Momin MD On:4/15/2024 8:07 PM       Assessment / Plan     Summary: 63-year-old male with vascular dementia, advanced, brought in by family as they can no longer take care of him, apparently had been in a nursing home and one of the patient's sisters had signed him out but now no longer willing to take care of him and declined to pick him up from the hospital.  APS/suspect will be difficult for placement    Assessment/Plan (clinically significant if listed here)  Advanced vascular dementia with intermittent behavioral disturbances  Deconditioning  APS concerns  History of CVA  Dyslipidemia  Hypertension    Continue home Zyprexa   Continue home amlodipine. Bp remains stable   Continue home aspirin and statin and Plavix for CVA history  As needed pain medication  External urinary catheter for now  Use Eliquis for DVT prophylaxis   Social work/HPI/placement difficulties  Head imaging limited by motion volume loss greater than expected for patient's age with chronic vessel ischemic changes and remote lacunar infarcts consistent with patient's vascular dementia history and advanced state  PT/OT  Sister is trying to get his bed and then will take him home         DVT prophylaxis:  Medical DVT prophylaxis orders are present.      Code Status (Patient has no  pulse and is not breathing): CPR (Attempt to Resuscitate)  Medical Interventions (Patient has pulse or is breathing): Full Support

## 2024-04-21 ENCOUNTER — READMISSION MANAGEMENT (OUTPATIENT)
Dept: CALL CENTER | Facility: HOSPITAL | Age: 64
End: 2024-04-21
Payer: COMMERCIAL

## 2024-04-21 VITALS
HEIGHT: 72 IN | DIASTOLIC BLOOD PRESSURE: 75 MMHG | TEMPERATURE: 97.7 F | WEIGHT: 190.48 LBS | BODY MASS INDEX: 25.8 KG/M2 | SYSTOLIC BLOOD PRESSURE: 113 MMHG | OXYGEN SATURATION: 94 % | RESPIRATION RATE: 18 BRPM | HEART RATE: 55 BPM

## 2024-04-21 PROCEDURE — 99238 HOSP IP/OBS DSCHRG MGMT 30/<: CPT | Performed by: INTERNAL MEDICINE

## 2024-04-21 RX ADMIN — CLOPIDOGREL BISULFATE 75 MG: 75 TABLET ORAL at 09:55

## 2024-04-21 RX ADMIN — Medication 10 ML: at 09:55

## 2024-04-21 RX ADMIN — AMLODIPINE BESYLATE 2.5 MG: 5 TABLET ORAL at 09:55

## 2024-04-21 RX ADMIN — Medication 2000 UNITS: at 09:54

## 2024-04-21 RX ADMIN — ASPIRIN 81 MG: 81 TABLET, COATED ORAL at 09:54

## 2024-04-21 RX ADMIN — APIXABAN 2.5 MG: 2.5 TABLET, FILM COATED ORAL at 09:55

## 2024-04-21 NOTE — PLAN OF CARE
Goal Outcome Evaluation:  Plan of Care Reviewed With: patient        Progress: no change  Outcome Evaluation: pt denies any pain or discomfort throughout shift. Cath care and skin care completed. Awaiting discharge via family.

## 2024-04-21 NOTE — PLAN OF CARE
Goal Outcome Evaluation:  Plan of Care Reviewed With: patient        Progress: no change  Outcome Evaluation: Pt denied pain/discomfort this shift. Pt was alert to person and place this shift. Skin care provided as needed and pt being turned/repositioned to promote skin integrity. Hopefully bed will be ready and available from pt's sister that she will receive from the caregiver today. Pt is currently resting with no apparent distress at this time, call light in reach. No new issues or new needs noted at this time.

## 2024-04-21 NOTE — OUTREACH NOTE
Prep Survey      Flowsheet Row Responses   [facility] patient discharged from? [facility]   Is LACE score < 7 ? No   Eligibility [facility]   Date of Admission 04/15/24   Date of Discharge 04/21/24   Discharge Disposition Home or Self Care  [possibly a group home or at a caregiver home???]   Discharge diagnosis *Physical deconditioning   Does the patient have one of the following disease processes/diagnoses(primary or secondary)? Other   Does the patient have Home health ordered? No   Is there a DME ordered? No   Prep survey completed? Yes            SERGIO MORELOS - Registered Nurse

## 2024-04-21 NOTE — DISCHARGE SUMMARY
[facility]         HOSPITALIST  DISCHARGE SUMMARY    Patient Name: Jamshid Carrero  : 1960  MRN: [id number]    Date of Admission: 4/15/2024  Date of Discharge:  2024    Primary Care Physician: Shelton Terry MD    Consults       Date and Time Order Name Status Description    4/15/2024 11:00 PM Inpatient Hospitalist Consult              Active and Resolved Hospital Problems:  Active Hospital Problems    Diagnosis POA   • **Physical deconditioning [R53.81] Yes   • Dementia with behavioral disturbance [F03.918] Yes   • Essential hypertension [I10] Yes      Resolved Hospital Problems   No resolved problems to display.       Hospital Course     Hospital Course:  Jamshid Carrero is a 63 y.o. male with past medical history of vascular dementia, hyperlipidemia and history of CVA. Patient was brought in because family can no longer take care of him. It sounds like he was in the nursing home when his sisters signed him out however they now refused to care for him and refused to pick him up from the hospital.  Patient was admitted to the hospital and started on all his home medication.  Social work Talked with patient's Sister Radha who at this time has agreed to take patient back home and care for patient at home.  Radha had to obtain patient's hospital bed from his previous caregiver.  Radha has contacted nursing staff today stating that she will be able to  patient later today.  Social work has approved the discharge plan.    The patient needs a bedside commode at home for the following reason: the patient is confined to a single room.      DISCHARGE Follow Up Recommendations for labs and diagnostics:   Follow up with pcp in 1 week       Day of Discharge     Vital Signs:  Temp:  [97.2 °F (36.2 °C)-97.9 °F (36.6 °C)] 97.7 °F (36.5 °C)  Heart Rate:  [53-66] 55  Resp:  [18-20] 18  BP: (105-129)/(68-85) 113/75  Physical Exam:   Chronically ill-appearing in bed  tired  CTAB  RRR  Abdomen soft  Not acutely agitated, in bed eating lunch      Discharge Details        Discharge Medications        Continue These Medications        Instructions Start Date   amLODIPine 2.5 MG tablet  Commonly known as: NORVASC   2.5 mg, Oral, Daily      aspirin 81 MG EC tablet   81 mg, Oral, Daily      atorvastatin 40 MG tablet  Commonly known as: LIPITOR   40 mg, Oral, Daily      cholecalciferol 25 MCG (1000 UT) tablet   50 mcg, Oral, Daily      clopidogrel 75 MG tablet  Commonly known as: PLAVIX   75 mg, Oral, Daily      Diclofenac Sodium 1 % gel gel  Commonly known as: Voltaren   4 g, Topical, 4 Times Daily PRN      Melatonin 3 MG capsule   3 mg, Oral, Nightly PRN, Take a few hours before bedtime.      OLANZapine zydis 5 MG disintegrating tablet  Commonly known as: zyPREXA   5 mg, Oral, Nightly      sertraline 50 MG tablet  Commonly known as: ZOLOFT   50 mg, Oral, Daily               Allergies   Allergen Reactions   • Codeine Shortness Of Breath   • Acetaminophen Palpitations     Tylenol 3       Discharge Disposition:  Home or Self Care    Diet:  Hospital:  Diet Order   Procedures   • Diet: Regular/House; Adaptive Utensils; Texture: Pureed (NDD 1); Fluid Consistency: Thin (IDDSI 0)       Discharge Activity: patient is bed bound       CODE STATUS:  Code Status and Medical Interventions:   Ordered at: 04/15/24 0404     Code Status (Patient has no pulse and is not breathing):    CPR (Attempt to Resuscitate)     Medical Interventions (Patient has pulse or is breathing):    Full Support         No future appointments.    Additional Instructions for the Follow-ups that You Need to Schedule       Discharge Follow-up with PCP   As directed       Currently Documented PCP:    Shelton Terry MD    PCP Phone Number:    [phone]     Follow Up Details: in 1 week                Pertinent  and/or Most Recent Results     PROCEDURES:   none    LAB RESULTS:      Lab 04/16/24  0519 04/15/24  1932   WBC  5.62 5.07   HEMOGLOBIN 14.5 15.0   HEMATOCRIT 45.8 45.1   PLATELETS 183 187   NEUTROS ABS 3.35 3.10   IMMATURE GRANS (ABS) 0.01 0.02   LYMPHS ABS 1.35 1.18   MONOS ABS 0.59 0.55   EOS ABS 0.18 0.12   MCV 92.7 90.4         Lab 04/16/24  0519 04/15/24  1932   SODIUM 141 143   POTASSIUM 3.7 3.9   CHLORIDE 107 107   CO2 22.5 26.2   ANION GAP 11.5 9.8   BUN 21 23   CREATININE 0.76 0.93   EGFR 102.3 93.4   GLUCOSE 76 82   CALCIUM 8.8 8.9   MAGNESIUM  --  1.7   TSH  --  2.500         Lab 04/16/24  0519 04/15/24  1932   TOTAL PROTEIN 6.3 6.6   ALBUMIN 3.8 4.1   GLOBULIN 2.5 2.5   ALT (SGPT) 17 18   AST (SGOT) 23 22   BILIRUBIN 0.6 0.5   ALK PHOS 80 90                     Brief Urine Lab Results  (Last result in the past 365 days)        Color   Clarity   Blood   Leuk Est   Nitrite   Protein   CREAT   Urine HCG        04/15/24 2224 Yellow   Clear   Negative   Negative   Negative   Negative                 Microbiology Results (last 10 days)       ** No results found for the last 240 hours. **            CT Head Without Contrast    Result Date: 4/15/2024  Impression: Limited evaluation due to motion. Volume loss more than expected for patient's age moderate in degree. A large amount of low-density periventricular and subcortical white matter. This is nonspecific but most commonly seen with chronic some vessel ischemic change. Remote lacunar infarcts as described. Brain MRI is more sensitive to evaluate for acute or subacute infarct.    Electronically Signed By-Peyton Momin MD On:4/15/2024 8:07 PM                    Labs Pending at Discharge:        Time spent on Discharge including face to face service:  25 minutes    Electronically signed by Rodrigo Shaffer DO, 04/21/24, 3:28 PM EDT.

## 2024-04-22 ENCOUNTER — TRANSITIONAL CARE MANAGEMENT TELEPHONE ENCOUNTER (OUTPATIENT)
Dept: CALL CENTER | Facility: HOSPITAL | Age: 64
End: 2024-04-22
Payer: COMMERCIAL

## 2024-04-22 NOTE — OUTREACH NOTE
Call Center TCM Note      Flowsheet Row Responses   [facility] patient discharged from? [facility]   Does the patient have one of the following disease processes/diagnoses(primary or secondary)? Other   TCM attempt successful? Yes   Call start time 1601   Revoked Reason Other  [Patient does not have a phone #. The PCP verbal release lists caregiver Waltmireya Ashlie only. I spoke to Kym, she states that she is no longer a caregiver for patient.]   Call end time 1604   Is patient permission given to speak with other caregiver? Yes   Person spoke with today (if not patient) and relationship Kym Dailey, Caregiver, only person listed on PCP verbal release.   Call end time 1604            Nancy Guevara RN    4/22/2024, 16:06 EDT

## 2024-04-25 LAB
QT INTERVAL: 466 MS
QTC INTERVAL: 432 MS

## 2024-08-02 ENCOUNTER — OFFICE VISIT (OUTPATIENT)
Dept: INTERNAL MEDICINE | Facility: CLINIC | Age: 64
End: 2024-08-02
Payer: COMMERCIAL

## 2024-08-02 VITALS
BODY MASS INDEX: 24.79 KG/M2 | RESPIRATION RATE: 16 BRPM | WEIGHT: 183 LBS | HEIGHT: 72 IN | TEMPERATURE: 97.1 F | HEART RATE: 91 BPM | OXYGEN SATURATION: 96 % | DIASTOLIC BLOOD PRESSURE: 80 MMHG | SYSTOLIC BLOOD PRESSURE: 102 MMHG

## 2024-08-02 DIAGNOSIS — R53.81 PHYSICAL DECONDITIONING: ICD-10-CM

## 2024-08-02 DIAGNOSIS — I10 ESSENTIAL HYPERTENSION: Primary | ICD-10-CM

## 2024-08-02 DIAGNOSIS — E55.9 VITAMIN D DEFICIENCY: ICD-10-CM

## 2024-08-02 DIAGNOSIS — R73.9 HYPERGLYCEMIA: ICD-10-CM

## 2024-08-02 DIAGNOSIS — R32 URINARY INCONTINENCE, UNSPECIFIED TYPE: ICD-10-CM

## 2024-08-02 DIAGNOSIS — R47.1 DYSARTHRIA: ICD-10-CM

## 2024-08-02 DIAGNOSIS — F03.918 DEMENTIA WITH BEHAVIORAL DISTURBANCE: ICD-10-CM

## 2024-08-02 DIAGNOSIS — F41.9 ANXIETY: ICD-10-CM

## 2024-08-02 DIAGNOSIS — Z86.73 HISTORY OF CVA (CEREBROVASCULAR ACCIDENT): ICD-10-CM

## 2024-08-02 LAB
25(OH)D3 SERPL-MCNC: 34.6 NG/ML (ref 30–100)
ALBUMIN SERPL-MCNC: 4.1 G/DL (ref 3.5–5.2)
ALBUMIN/GLOB SERPL: 1.3 G/DL
ALP SERPL-CCNC: 94 U/L (ref 39–117)
ALT SERPL W P-5'-P-CCNC: 15 U/L (ref 1–41)
ANION GAP SERPL CALCULATED.3IONS-SCNC: 11.9 MMOL/L (ref 5–15)
AST SERPL-CCNC: 24 U/L (ref 1–40)
BASOPHILS # BLD AUTO: 0.1 10*3/MM3 (ref 0–0.2)
BASOPHILS NFR BLD AUTO: 2 % (ref 0–1.5)
BILIRUB SERPL-MCNC: 0.6 MG/DL (ref 0–1.2)
BUN SERPL-MCNC: 16 MG/DL (ref 8–23)
BUN/CREAT SERPL: 19 (ref 7–25)
CALCIUM SPEC-SCNC: 9.4 MG/DL (ref 8.6–10.5)
CHLORIDE SERPL-SCNC: 105 MMOL/L (ref 98–107)
CHOLEST SERPL-MCNC: 162 MG/DL (ref 0–200)
CO2 SERPL-SCNC: 23.1 MMOL/L (ref 22–29)
CREAT SERPL-MCNC: 0.84 MG/DL (ref 0.76–1.27)
DEPRECATED RDW RBC AUTO: 43.3 FL (ref 37–54)
EGFRCR SERPLBLD CKD-EPI 2021: 99.2 ML/MIN/1.73
EOSINOPHIL # BLD AUTO: 0.14 10*3/MM3 (ref 0–0.4)
EOSINOPHIL NFR BLD AUTO: 2.8 % (ref 0.3–6.2)
ERYTHROCYTE [DISTWIDTH] IN BLOOD BY AUTOMATED COUNT: 13.5 % (ref 12.3–15.4)
GLOBULIN UR ELPH-MCNC: 3.1 GM/DL
GLUCOSE SERPL-MCNC: 131 MG/DL (ref 65–99)
HCT VFR BLD AUTO: 47.7 % (ref 37.5–51)
HDLC SERPL-MCNC: 36 MG/DL (ref 40–60)
HGB BLD-MCNC: 16.2 G/DL (ref 13–17.7)
IMM GRANULOCYTES # BLD AUTO: 0.01 10*3/MM3 (ref 0–0.05)
IMM GRANULOCYTES NFR BLD AUTO: 0.2 % (ref 0–0.5)
LDLC SERPL CALC-MCNC: 94 MG/DL (ref 0–100)
LDLC/HDLC SERPL: 2.49 {RATIO}
LYMPHOCYTES # BLD AUTO: 1.7 10*3/MM3 (ref 0.7–3.1)
LYMPHOCYTES NFR BLD AUTO: 33.9 % (ref 19.6–45.3)
MCH RBC QN AUTO: 29.9 PG (ref 26.6–33)
MCHC RBC AUTO-ENTMCNC: 34 G/DL (ref 31.5–35.7)
MCV RBC AUTO: 88.2 FL (ref 79–97)
MONOCYTES # BLD AUTO: 0.48 10*3/MM3 (ref 0.1–0.9)
MONOCYTES NFR BLD AUTO: 9.6 % (ref 5–12)
NEUTROPHILS NFR BLD AUTO: 2.58 10*3/MM3 (ref 1.7–7)
NEUTROPHILS NFR BLD AUTO: 51.5 % (ref 42.7–76)
NRBC BLD AUTO-RTO: 0 /100 WBC (ref 0–0.2)
PLATELET # BLD AUTO: 198 10*3/MM3 (ref 140–450)
PMV BLD AUTO: 12.6 FL (ref 6–12)
POTASSIUM SERPL-SCNC: 3.7 MMOL/L (ref 3.5–5.2)
PROT SERPL-MCNC: 7.2 G/DL (ref 6–8.5)
RBC # BLD AUTO: 5.41 10*6/MM3 (ref 4.14–5.8)
SODIUM SERPL-SCNC: 140 MMOL/L (ref 136–145)
TRIGL SERPL-MCNC: 182 MG/DL (ref 0–150)
TSH SERPL DL<=0.05 MIU/L-ACNC: 3.67 UIU/ML (ref 0.27–4.2)
VLDLC SERPL-MCNC: 32 MG/DL (ref 5–40)
WBC NRBC COR # BLD AUTO: 5.01 10*3/MM3 (ref 3.4–10.8)

## 2024-08-02 PROCEDURE — 82306 VITAMIN D 25 HYDROXY: CPT | Performed by: PHYSICIAN ASSISTANT

## 2024-08-02 PROCEDURE — 80061 LIPID PANEL: CPT | Performed by: PHYSICIAN ASSISTANT

## 2024-08-02 PROCEDURE — 80050 GENERAL HEALTH PANEL: CPT | Performed by: PHYSICIAN ASSISTANT

## 2024-08-02 RX ORDER — OLANZAPINE 5 MG/1
5 TABLET ORAL NIGHTLY
Qty: 90 TABLET | Refills: 1 | Status: SHIPPED | OUTPATIENT
Start: 2024-08-02

## 2024-08-02 RX ORDER — ATORVASTATIN CALCIUM 40 MG/1
40 TABLET, FILM COATED ORAL DAILY
Qty: 90 TABLET | Refills: 1 | Status: SHIPPED | OUTPATIENT
Start: 2024-08-02

## 2024-08-02 RX ORDER — OLANZAPINE 5 MG/1
5 TABLET, ORALLY DISINTEGRATING ORAL NIGHTLY
Status: CANCELLED | OUTPATIENT
Start: 2024-08-02

## 2024-08-02 RX ORDER — CLOPIDOGREL BISULFATE 75 MG/1
75 TABLET ORAL DAILY
Qty: 90 TABLET | Refills: 1 | Status: SHIPPED | OUTPATIENT
Start: 2024-08-02

## 2024-08-02 RX ORDER — ASPIRIN 81 MG/1
81 TABLET ORAL DAILY
Qty: 90 TABLET | Refills: 1 | Status: SHIPPED | OUTPATIENT
Start: 2024-08-02

## 2024-08-02 RX ORDER — CHOLECALCIFEROL (VITAMIN D3) 25 MCG
2000 TABLET ORAL DAILY
Qty: 180 TABLET | Refills: 1 | Status: SHIPPED | OUTPATIENT
Start: 2024-08-02

## 2024-08-02 RX ORDER — AMLODIPINE BESYLATE 2.5 MG/1
2.5 TABLET ORAL DAILY
Qty: 90 TABLET | Refills: 1 | Status: SHIPPED | OUTPATIENT
Start: 2024-08-02

## 2024-08-02 NOTE — PROGRESS NOTES
"Chief Complaint  Establish Care, dysarthria, h/o stroke, weakness    Subjective          Jamshid Carrero presents to [facility]  History of Present Illness  Pt had stroke 5 yrs ago  Has been living in home with caregivers since Feb 2024    Last PCP: Dr. Terry  Previous Specialists: Neurology- Dr. Blas Zaldivar    Last labs: 3 months ago  Last colonoscopy: never,  no family history of colon cancer     Pt ran out of medicines 2 days ago   Pt needs a new wheelchair, his does not fit in caregiver home  He has been in wheelchair since stroke  He cannot really walk   Pt is incontinent   He needs local neurologist  Pt states he would like something to help him sleep   Dementia He cries often   Pt wants to be with family by has noone to care for him so he lives with caregiver.  Lost his brother to MI yesterday  Pt has irritability at times   Denies si/hi   Pt has sundowner  Gets anxiety and scared at bedtime at times    Past Medical History:   Diagnosis Date    Dementia     Hyperlipidemia     Stroke         History reviewed. No pertinent surgical history.     No current outpatient medications on file prior to visit.     No current facility-administered medications on file prior to visit.        Allergies   Allergen Reactions    Codeine Shortness Of Breath    Acetaminophen Palpitations     Tylenol 3       Social History     Tobacco Use   Smoking Status Never   Smokeless Tobacco Never          Objective   Vital Signs:   /80 (BP Location: Right arm, Patient Position: Sitting, Cuff Size: Adult)   Pulse 91   Temp 97.1 °F (36.2 °C) (Temporal)   Resp 16   Ht 182.9 cm (72.01\")   Wt 83 kg (183 lb)   SpO2 96%   BMI 24.81 kg/m²     Physical Exam  Vitals reviewed.   Constitutional:       Appearance: Normal appearance.   HENT:      Head: Normocephalic and atraumatic.      Nose: Nose normal.      Mouth/Throat:      Mouth: Mucous membranes are moist.   Eyes:      " Extraocular Movements: Extraocular movements intact.      Conjunctiva/sclera: Conjunctivae normal.      Pupils: Pupils are equal, round, and reactive to light.   Cardiovascular:      Rate and Rhythm: Normal rate and regular rhythm.   Pulmonary:      Effort: Pulmonary effort is normal.      Breath sounds: Normal breath sounds.   Abdominal:      General: Abdomen is flat. Bowel sounds are normal.      Palpations: Abdomen is soft.   Musculoskeletal:         General: Normal range of motion.   Neurological:      General: No focal deficit present.      Mental Status: He is alert and oriented to person, place, and time.   Psychiatric:         Mood and Affect: Mood normal.        Result Review :                        Assessment and Plan    Diagnoses and all orders for this visit:    1. Essential hypertension (Primary)  Assessment & Plan:  Hypertension is stable and controlled  Continue current treatment regimen.  Blood pressure will be reassessed in 3 months.    Orders:  -     Ambulatory Referral to Home Health  -     Comprehensive Metabolic Panel  -     CBC & Differential  -     TSH  -     Lipid Panel  -     Ambulatory Referral to Neurology    2. Urinary incontinence, unspecified type  -     Ambulatory Referral to Home Health    3. History of CVA (cerebrovascular accident)  -     Ambulatory Referral to Home [facility]  -     Standard Wheelchair  -     Ambulatory Referral to Neurology    4. Physical deconditioning  Assessment & Plan:  Will refer to home [facility] for PT, OT, speech. Order sent for new wheelchair.    Orders:  -     Ambulatory Referral to Home [facility]  -     Standard Wheelchair    5. Anxiety  Assessment & Plan:  Will restart medicine and add Zoloft to see if it helps with mood and sleep. To er if si/hi.    Orders:  -     sertraline (ZOLOFT) 50 MG tablet; Take 1 tablet by mouth Daily for 180 days.  Dispense: 90 tablet; Refill: 1    6. Dementia with behavioral disturbance  Assessment & Plan:  Reviewed previous neuro  note, will refer to local provider. Cont Zyprexa.     Orders:  -     Ambulatory Referral to Home Health  -     Ambulatory Referral to Neurology    7. Dysarthria  -     Ambulatory Referral to Home Health  -     Ambulatory Referral to Neurology    8. Vitamin D deficiency  -     Vitamin D 25 hydroxy    9. Hyperglycemia  -     Hemoglobin A1c    Other orders  -     clopidogrel (PLAVIX) 75 MG tablet; Take 1 tablet by mouth Daily.  Dispense: 90 tablet; Refill: 1  -     aspirin 81 MG EC tablet; Take 1 tablet by mouth Daily.  Dispense: 90 tablet; Refill: 1  -     amLODIPine (NORVASC) 2.5 MG tablet; Take 1 tablet by mouth Daily.  Dispense: 90 tablet; Refill: 1  -     Cholecalciferol 25 MCG (1000 UT) tablet; Take 2 tablets by mouth Daily.  Dispense: 180 tablet; Refill: 1  -     atorvastatin (LIPITOR) 40 MG tablet; Take 1 tablet by mouth Daily.  Dispense: 90 tablet; Refill: 1  -     OLANZapine (ZyPREXA) 5 MG tablet; Take 1 tablet by mouth Every Night.  Dispense: 90 tablet; Refill: 1        Follow Up   Return in about 6 weeks (around 9/13/2024).  Patient was given instructions and counseling regarding his condition or for health maintenance advice. Please see specific information pulled into the AVS if appropriate.

## 2024-08-21 ENCOUNTER — TELEPHONE (OUTPATIENT)
Dept: INTERNAL MEDICINE | Facility: CLINIC | Age: 64
End: 2024-08-21
Payer: COMMERCIAL

## 2024-08-21 NOTE — TELEPHONE ENCOUNTER
Called pt's caregiver to let them know that we have not gotten the paperwork for pt's supplies. Stated in VM if they could bring that paperwork into the office or have the supplier re-fax it again.

## 2024-09-12 ENCOUNTER — REFERRAL TRIAGE (OUTPATIENT)
Dept: CASE MANAGEMENT | Facility: OTHER | Age: 64
End: 2024-09-12
Payer: COMMERCIAL

## 2024-09-12 DIAGNOSIS — Z86.73 HISTORY OF CVA (CEREBROVASCULAR ACCIDENT): ICD-10-CM

## 2024-09-12 DIAGNOSIS — I10 ESSENTIAL HYPERTENSION: ICD-10-CM

## 2024-09-12 DIAGNOSIS — R32 URINARY INCONTINENCE, UNSPECIFIED TYPE: Primary | ICD-10-CM

## 2024-09-12 DIAGNOSIS — R47.1 DYSARTHRIA: ICD-10-CM

## 2024-09-12 DIAGNOSIS — F41.9 ANXIETY: ICD-10-CM

## 2024-09-12 DIAGNOSIS — R53.81 PHYSICAL DECONDITIONING: ICD-10-CM

## 2024-09-12 DIAGNOSIS — F03.918 DEMENTIA WITH BEHAVIORAL DISTURBANCE: ICD-10-CM

## 2024-09-13 ENCOUNTER — TELEMEDICINE (OUTPATIENT)
Dept: INTERNAL MEDICINE | Facility: CLINIC | Age: 64
End: 2024-09-13
Payer: COMMERCIAL

## 2024-09-13 DIAGNOSIS — E78.5 HYPERLIPIDEMIA, UNSPECIFIED HYPERLIPIDEMIA TYPE: ICD-10-CM

## 2024-09-13 DIAGNOSIS — F41.9 ANXIETY: Primary | ICD-10-CM

## 2024-09-13 DIAGNOSIS — I10 ESSENTIAL HYPERTENSION: ICD-10-CM

## 2024-09-13 DIAGNOSIS — F03.918 DEMENTIA WITH BEHAVIORAL DISTURBANCE: ICD-10-CM

## 2024-09-13 DIAGNOSIS — R53.81 PHYSICAL DECONDITIONING: ICD-10-CM

## 2024-09-13 PROCEDURE — 99213 OFFICE O/P EST LOW 20 MIN: CPT | Performed by: PHYSICIAN ASSISTANT

## 2024-09-13 NOTE — PROGRESS NOTES
Chief Complaint  No chief complaint on file.    Subjective          Jamshid Carrero presents to [facility]  History of Present Illness  Mode of Visit: Video using [employer] , video and audio was used.  Location of patient: at home  Location of provider: [facility] Office in [place], [place]  You have chosen to receive care through a telehealth visit.    Patient authorizes the electronic transmission of medical information and/or videoconference session. Patient understands that he/she can still pursue face-to-face consultation if desired. Patient understands that as with any technology, telemedicine does have its limitations. There is no guarantee, therefore, that this telemedicine session will eliminate the need for patient to see a provider in person if the provider feels a physical exams or vital signs are neccessary to care for the patient. Patient understands and would like to proceed with telemedicine visit at this time.Patient agrees to participate in a telemedicine evaluation performed by Jo Benites PA-C.    Depression and anxiety: pt states Zoloft has helped with his mood  He states he is sleeping better  Pt caregiver states she can tell improvement since starting zoloft   Denies si/hi    Dementia: has neuro appt for dec 2024    Weakness in legs: has not started home health PT yet  Caregiver states [facility] has a home health program they are trying to enroll patient into  He has not heard about wheelchair yet    Incontinence: he was able to get supplies      Past Medical History:   Diagnosis Date    Dementia     Hyperlipidemia     Stroke         No past surgical history on file.     Current Outpatient Medications on File Prior to Visit   Medication Sig Dispense Refill    amLODIPine (NORVASC) 2.5 MG tablet Take 1 tablet by mouth Daily. 90 tablet 1    aspirin 81 MG EC tablet Take 1 tablet by mouth Daily. 90 tablet 1    atorvastatin (LIPITOR)  40 MG tablet Take 1 tablet by mouth Daily. 90 tablet 1    Cholecalciferol 25 MCG (1000 UT) tablet Take 2 tablets by mouth Daily. 180 tablet 1    clopidogrel (PLAVIX) 75 MG tablet Take 1 tablet by mouth Daily. 90 tablet 1    OLANZapine (ZyPREXA) 5 MG tablet Take 1 tablet by mouth Every Night. 90 tablet 1    sertraline (ZOLOFT) 50 MG tablet Take 1 tablet by mouth Daily for 180 days. 90 tablet 1     No current facility-administered medications on file prior to visit.        Allergies   Allergen Reactions    Codeine Shortness Of Breath    Acetaminophen Palpitations     Tylenol 3       Social History     Tobacco Use   Smoking Status Never   Smokeless Tobacco Never          Objective   Vital Signs:   There were no vitals taken for this visit.    Physical Exam  Constitutional:       Appearance: Normal appearance.   HENT:      Head: Normocephalic.   Pulmonary:      Effort: Pulmonary effort is normal.   Neurological:      Mental Status: He is alert and oriented to person, place, and time.   Psychiatric:         Mood and Affect: Mood normal.        Result Review :   The following data was reviewed by: Jo Benites PA-C on 09/13/2024:  Common labs          4/15/2024    19:32 4/16/2024    05:19 8/2/2024    16:17   Common Labs   Glucose 82  76  131    BUN 23  21  16    Creatinine 0.93  0.76  0.84    Sodium 143  141  140    Potassium 3.9  3.7  3.7    Chloride 107  107  105    Calcium 8.9  8.8  9.4    Albumin 4.1  3.8  4.1    Total Bilirubin 0.5  0.6  0.6    Alkaline Phosphatase 90  80  94    AST (SGOT) 22  23  24    ALT (SGPT) 18  17  15    WBC 5.07  5.62  5.01    Hemoglobin 15.0  14.5  16.2    Hematocrit 45.1  45.8  47.7    Platelets 187  183  198    Total Cholesterol   162    Triglycerides   182    HDL Cholesterol   36    LDL Cholesterol    94           BMI is within normal parameters. No other follow-up for BMI required.              Assessment and Plan    Diagnoses and all orders for this visit:    1. Anxiety  (Primary)  Comments:  improved, cont current medicine    2. Dementia with behavioral disturbance  Comments:  Keep upcoming appt with neurology    3. Physical deconditioning  Comments:  Currently looking for home health company to accpet pt insurance. Pt caregiver also working with [facility] about program.    4. Hyperlipidemia, unspecified hyperlipidemia type  Comments:  reviewed labs, cont statin    5. Essential hypertension  Comments:  unable to assess due to telehealth        Follow Up   Return in about 3 months (around 12/13/2024).  Patient was given instructions and counseling regarding his condition or for health maintenance advice. Please see specific information pulled into the AVS if appropriate.

## 2024-09-17 ENCOUNTER — PATIENT OUTREACH (OUTPATIENT)
Dept: CASE MANAGEMENT | Facility: OTHER | Age: 64
End: 2024-09-17
Payer: COMMERCIAL

## 2024-09-17 DIAGNOSIS — R47.1 DYSARTHRIA: Primary | ICD-10-CM

## 2024-09-17 DIAGNOSIS — E78.5 HYPERLIPIDEMIA, UNSPECIFIED HYPERLIPIDEMIA TYPE: ICD-10-CM

## 2024-09-17 DIAGNOSIS — I63.9 CEREBROVASCULAR ACCIDENT (CVA), UNSPECIFIED MECHANISM: ICD-10-CM

## 2024-09-17 DIAGNOSIS — I10 ESSENTIAL HYPERTENSION: ICD-10-CM

## 2024-09-19 ENCOUNTER — PATIENT OUTREACH (OUTPATIENT)
Dept: CASE MANAGEMENT | Facility: OTHER | Age: 64
End: 2024-09-19
Payer: COMMERCIAL

## 2024-09-19 DIAGNOSIS — I63.9 CEREBROVASCULAR ACCIDENT (CVA), UNSPECIFIED MECHANISM: Primary | ICD-10-CM

## 2024-09-19 DIAGNOSIS — E78.5 HYPERLIPIDEMIA, UNSPECIFIED HYPERLIPIDEMIA TYPE: ICD-10-CM

## 2024-09-27 ENCOUNTER — PATIENT OUTREACH (OUTPATIENT)
Dept: CASE MANAGEMENT | Facility: OTHER | Age: 64
End: 2024-09-27
Payer: COMMERCIAL

## 2024-09-27 DIAGNOSIS — I63.9 CEREBROVASCULAR ACCIDENT (CVA), UNSPECIFIED MECHANISM: Primary | ICD-10-CM

## 2024-09-27 DIAGNOSIS — I10 ESSENTIAL HYPERTENSION: ICD-10-CM

## 2024-10-25 ENCOUNTER — TELEPHONE (OUTPATIENT)
Dept: CASE MANAGEMENT | Facility: OTHER | Age: 64
End: 2024-10-25
Payer: COMMERCIAL

## 2024-10-25 DIAGNOSIS — Z86.73 HISTORY OF CVA (CEREBROVASCULAR ACCIDENT): ICD-10-CM

## 2024-10-25 DIAGNOSIS — R53.81 PHYSICAL DECONDITIONING: ICD-10-CM

## 2024-10-25 DIAGNOSIS — F03.918 DEMENTIA WITH BEHAVIORAL DISTURBANCE: Primary | ICD-10-CM

## 2024-10-29 ENCOUNTER — PATIENT OUTREACH (OUTPATIENT)
Dept: CASE MANAGEMENT | Facility: OTHER | Age: 64
End: 2024-10-29
Payer: COMMERCIAL

## 2024-10-29 DIAGNOSIS — I10 ESSENTIAL HYPERTENSION: ICD-10-CM

## 2024-10-29 DIAGNOSIS — I63.9 CEREBROVASCULAR ACCIDENT (CVA), UNSPECIFIED MECHANISM: Primary | ICD-10-CM

## 2024-10-29 NOTE — OUTREACH NOTE
AMBULATORY CASE MANAGEMENT NOTE    Names and Relationships of Patient/Support Persons: Contact: ELSY PETERSON; Relationship: Emergency Contact -     [facility] Interim Update    I spoke with Elsy, the caregiver of the patient, on the phone. They are in the process of getting approved for the waiver program, through [employer] for paid caregiver services, foaming body wash and Ensure.    She states no one has contacted them about a wheelchair, as ordered by PCP.    Patient has a neurology appointment scheduled from the referral that was placed.    Patient has a follow up PCP appointment scheduled.    We are unable to get home health services for patient due to them not accepting the patient's insurance. I discussed this in detail with Elsy. I also offered to have PCP place an order for outpatient PT. She declined at this time.    We discussed that the patient does not have an ACP and the importance of this.    Medication list reviewed in full. Patient is adherent with  medications.    Care Coordination    I am following up with the PCP's MA regarding the ordering of a wheelchair.    I am mailing out a [employer] brochure once again.        Education Documentation  Activity, taught by Desi Celaya, RN at 10/29/2024  1:26 PM.  Learner: Caregiver  Readiness: Acceptance  Method: Explanation  Response: Verbalizes Understanding, Needs Reinforcement    Safety, taught by Desi Celaya, RN at 10/29/2024  1:26 PM.  Learner: Caregiver  Readiness: Acceptance  Method: Explanation  Response: Verbalizes Understanding, Needs Reinforcement    Home Safety, taught by Desi Celaya, RN at 10/29/2024  1:26 PM.  Learner: Caregiver  Readiness: Acceptance  Method: Explanation  Response: Verbalizes Understanding, Needs Reinforcement          Desi FLOWERS  Ambulatory Case Management    10/29/2024, 13:26 EDT

## 2024-10-30 ENCOUNTER — TELEPHONE (OUTPATIENT)
Dept: INTERNAL MEDICINE | Facility: CLINIC | Age: 64
End: 2024-10-30
Payer: COMMERCIAL

## 2024-10-30 NOTE — TELEPHONE ENCOUNTER
Order started for wheelchair on [employer]. Needing more information. I will speak with provider or MA for information tomorrow and complete.

## 2024-10-31 ENCOUNTER — PATIENT OUTREACH (OUTPATIENT)
Dept: CASE MANAGEMENT | Facility: OTHER | Age: 64
End: 2024-10-31
Payer: COMMERCIAL

## 2024-10-31 DIAGNOSIS — I10 ESSENTIAL HYPERTENSION: ICD-10-CM

## 2024-10-31 DIAGNOSIS — I63.9 CEREBROVASCULAR ACCIDENT (CVA), UNSPECIFIED MECHANISM: Primary | ICD-10-CM

## 2024-10-31 NOTE — OUTREACH NOTE
AMBULATORY CASE MANAGEMENT NOTE    Names and Relationships of Patient/Support Persons:  -     CCM End of Month Documentation    This Chronic Medical Management Care Plan for Jamshid Carrero, 64 y.o. male, has been monitored and managed; reviewed; a new plan of care implemented and a new plan of care implemented for the month of October.  A cumulative time of 23  minutes was spent on this patient record this month, including phone call with care giver; chart review.    Regarding the patient's problems: has Antifreeze poisoning; CVA, old, dysarthria; Hyperlipidemia; Elevated glucose; Dysarthria; Essential hypertension; Junctional bradycardia; Movement disorder; Cerebrovascular accident (CVA); Anxiety; Dementia with behavioral disturbance; Irritability and anger; and Physical deconditioning on their problem list., the following items were addressed: medical records; medications; referrals to community service providers, Patient participating in the waiver program with [employer] and any changes can be found within the plan section of the note.  A detailed listing of time spent for chronic care management is tracked within each outreach encounter.  Current medications include:  has a current medication list which includes the following prescription(s): amlodipine, aspirin, atorvastatin, cholecalciferol, clopidogrel, olanzapine, and sertraline. and the patient is reported to be patient is compliant with medication protocol, Med list reviewed,  Medications are reported to be effective, blood pressure well controlled with medications.  Regarding these diagnoses, referrals were made to the following provider(s):  NA.  All notes on chart for PCP to review.    The patient was monitored remotely for blood pressure; activity level; mood & behavior; medications.    The patient's physical needs include:  help taking medications as prescribed; physical healthcare; medication education; physician referral; DME supplies; needs  assistance with ADLs; resources for disability needs, still waiting on WC.     The patient's mental support needs include:  No data recorded    The patient's cognitive support needs include:  medication; increased support; coordination of community providers; needs assistance with ADLs; health care; caregiver, patient has a caregiver named Adis    The patient's psychosocial support needs include:  need for increased support; medication management or adherence    The patient's functional needs include: DME; needs assistance for ADLs; physical healthcare; medication education; resources for disability needs; physician referral, Needs     The patient's environmental needs include:  resources for disability needs, Wheelchair    Care Plan overall comments:  Revised and Ongoing    Refer to previous outreach notes for more information on the areas listed above.    Monthly Billing Diagnoses  (I63.9) Cerebrovascular accident (CVA), unspecified mechanism    (I10) Essential hypertension    Medications   Medications have been reconciled    Care Plan progress this month:      Recently Modified Care Plans Updates made since 9/30/2024 12:00 AM       Fall Risk (Adult)           Problem Priority Last Modified     Fall Risk --  10/29/2024  1:16 PM by Desi Celaya RN              Goal Recent Progress Last Modified     Absence of Fall and Fall-Related Injury --  10/29/2024  1:16 PM by Desi Celaya, RN     Evidence-based guidance:   Assess fall risk using a validated tool when available. Consider balance and gait impairment, muscle weakness, diminished vision or hearing, environmental hazards, presence of urinary or bowel urgency and/or incontinence.   Communicate fall injury risk to interprofessional healthcare team.   Develop a fall prevention plan with the patient and family.   Promote use of personal vision and auditory aids.   Promote reorientation, appropriate sensory stimulation, and routines to decrease risk of fall  when changes in mental status are present.   Assess assistance level required for safe and effective self-care; consider referral for home care.   Encourage physical activity, such as performance of self-care at highest level of ability, strength and balance exercise program, and provision of appropriate assistive devices; refer to rehabilitation therapy.   Refer to community-based fall prevention program where available.   If fall occurs, determine the cause and revise fall injury prevention plan.   Regularly review medication contribution to fall risk; consider risk related to polypharmacy and age.   Refer to pharmacist for consultation when concerns about medications are revealed.   Balance adequate pain management with potential for oversedation.   Provide guidance related to environmental modifications.   Consider supplementation with Vitamin D.    Notes:            Task Due Date Last Modified     Identify and Manage Contributors to Fall Risk --  10/29/2024  1:16 PM by Desi Celaya RN     Care Management Activities:      - not discussed during this outreach      Notes:                        Current Specialty Plan of Care Status signed by both patient and provider    Instructions   Patient was provided an electronic copy of care plan  CCM services were explained and offered and patient has accepted these services.  Patient has given their written consent to receive CCM services and understands that this includes the authorization of electronic communication of medical information with the other treating providers.  Patient understands that they may stop CCM services at any time and these changes will be effective at the end of the calendar month and will effectively revocate the agreement of CCM services.  Patient understands that only one practitioner can furnish and be paid for CCM services during one calendar month.  Patient also understands that there may be co-payment or deductible fees in association  with CCM services.  Patient will continue with at least monthly follow-up calls with the Ambulatory .    Desi FLOWERS  Ambulatory Case Management    10/31/2024, 14:42 EDT    Education Documentation  No documentation found.        Desi FLOWERS  Ambulatory Case Management    10/31/2024, 14:42 EDT

## 2024-11-01 DIAGNOSIS — R53.81 PHYSICAL DECONDITIONING: Primary | ICD-10-CM

## 2024-11-01 DIAGNOSIS — E44.1 MILD PROTEIN-CALORIE MALNUTRITION: ICD-10-CM

## 2024-11-01 RX ORDER — LACTOSE-REDUCED FOOD
1 LIQUID (ML) ORAL DAILY
Qty: 237 ML | Refills: 12 | Status: SHIPPED | OUTPATIENT
Start: 2024-11-01

## 2024-12-05 ENCOUNTER — TELEPHONE (OUTPATIENT)
Dept: CASE MANAGEMENT | Facility: OTHER | Age: 64
End: 2024-12-05
Payer: MEDICAID

## 2024-12-06 ENCOUNTER — TELEPHONE (OUTPATIENT)
Dept: CASE MANAGEMENT | Facility: OTHER | Age: 64
End: 2024-12-06
Payer: MEDICAID

## 2024-12-06 ENCOUNTER — PATIENT OUTREACH (OUTPATIENT)
Dept: CASE MANAGEMENT | Facility: OTHER | Age: 64
End: 2024-12-06
Payer: MEDICAID

## 2024-12-06 DIAGNOSIS — I63.9 CEREBROVASCULAR ACCIDENT (CVA), UNSPECIFIED MECHANISM: Primary | ICD-10-CM

## 2024-12-06 NOTE — TELEPHONE ENCOUNTER
I have pended an order for a shower chair for the patient. Please sign if agreeable.    FYI--I have also had [employer] fax the information for a wheelchair evaluation and wheelchair to [facility].     Thank you,  SUSHILA Weeks

## 2024-12-06 NOTE — TELEPHONE ENCOUNTER
I spoke with the patient's caregiver today. Jo ordered a wheelchair for the patient on 8/2/2024. She stated that no one has contacted her about getting a wheelchair. I can not tell that it has been ordered. If it has, could you please tell me which DME company it was ordered from and I will be glad to follow up on the order. If it has NOT been ordered, I will take care of it from here. Please check into this and let me know how I need to proceed.     Thank you!  Desi

## 2024-12-06 NOTE — OUTREACH NOTE
"AMBULATORY CASE MANAGEMENT NOTE    Names and Relationships of Patient/Support Persons:  -     [facility] Interim Update    I spoke with the patient's caregiver, Adis, today about the patient. She initially stated that the patient was doing well, overall but the more we talked, it seems as if the patient is having increased difficulty with ambulation and the motivation to ambulate. She believes the patient is afraid of falling and that is a barrier for him to be motivated to get out of bed. She states the patient also complains of being cold but rarely will wear clothes because \"he gets tangled up in them\".    Adis states that when patient does ambulate with his walker, it takes 2 assists.    The patient's PCP ordered a wheelchair for the patient on 8/2/2024 but Adis states no one has contacted them regarding this.    Adis states the patient could benefit from a shower chair and a new walker also.    I discussed outpatient PT with the caregiver. She is unsure that the patient will get up and go to those appointments. Home Health is not an option due to his insurance. The caregiver is not sure that the patient would participate if they came to the house.    Gela established with patient's caregiver. She has my direct number to call with further questions or concerns    Care Coordination    I have sent electronic communication to the PCP, MA and Practice manager regarding the WC order.    I have spoken with 2 DME companies about the patient's requests for DME equipment. I will follow up on this once I hear back from the PCP or MA.    Care Coordination    I have pended an order for a shower chair for the PCP to sign.    I have asked Greer to fax demographics, insurance information and office notes from 8/6/2024 to [facility]. She is agreeable to do this.    Care Coordination    Order for shower chair placed in [place] by this [facility].    Desi FLOWERS  Ambulatory Case Management    12/6/2024, 13:57 EST  "

## 2024-12-06 NOTE — TELEPHONE ENCOUNTER
It looks like the order was never finished in [employer] and someone put pt's name as kaitlynn calderón, so I am assuming it was never fully ordered I can try to reorder through [employer] or [facility] if you know where its supposed to go that works too.

## 2024-12-06 NOTE — TELEPHONE ENCOUNTER
Do you know anything about this order? It was in my note from 8/2/24 but I do not recall what happened after the order was printed. If it has not been started, please forward to Desi to help

## 2024-12-09 ENCOUNTER — OFFICE VISIT (OUTPATIENT)
Dept: INTERNAL MEDICINE | Facility: CLINIC | Age: 64
End: 2024-12-09
Payer: MEDICAID

## 2024-12-09 ENCOUNTER — PATIENT OUTREACH (OUTPATIENT)
Dept: CASE MANAGEMENT | Facility: OTHER | Age: 64
End: 2024-12-09
Payer: MEDICAID

## 2024-12-09 VITALS
SYSTOLIC BLOOD PRESSURE: 108 MMHG | OXYGEN SATURATION: 94 % | TEMPERATURE: 97.5 F | WEIGHT: 185 LBS | RESPIRATION RATE: 16 BRPM | HEART RATE: 63 BPM | HEIGHT: 72 IN | BODY MASS INDEX: 25.06 KG/M2 | DIASTOLIC BLOOD PRESSURE: 80 MMHG

## 2024-12-09 DIAGNOSIS — I10 ESSENTIAL HYPERTENSION: Primary | ICD-10-CM

## 2024-12-09 DIAGNOSIS — I63.9 CEREBROVASCULAR ACCIDENT (CVA), UNSPECIFIED MECHANISM: Primary | ICD-10-CM

## 2024-12-09 DIAGNOSIS — F41.9 ANXIETY: ICD-10-CM

## 2024-12-09 DIAGNOSIS — Z12.11 SCREEN FOR COLON CANCER: ICD-10-CM

## 2024-12-09 DIAGNOSIS — K59.00 CONSTIPATION, UNSPECIFIED CONSTIPATION TYPE: ICD-10-CM

## 2024-12-09 DIAGNOSIS — R73.9 HYPERGLYCEMIA: ICD-10-CM

## 2024-12-09 DIAGNOSIS — E78.5 HYPERLIPIDEMIA, UNSPECIFIED HYPERLIPIDEMIA TYPE: ICD-10-CM

## 2024-12-09 LAB
ALBUMIN SERPL-MCNC: 3.9 G/DL (ref 3.5–5.2)
ALBUMIN/GLOB SERPL: 1.2 G/DL
ALP SERPL-CCNC: 90 U/L (ref 39–117)
ALT SERPL W P-5'-P-CCNC: 14 U/L (ref 1–41)
ANION GAP SERPL CALCULATED.3IONS-SCNC: 11.3 MMOL/L (ref 5–15)
AST SERPL-CCNC: 25 U/L (ref 1–40)
BASOPHILS # BLD AUTO: 0.11 10*3/MM3 (ref 0–0.2)
BASOPHILS NFR BLD AUTO: 2.1 % (ref 0–1.5)
BILIRUB SERPL-MCNC: 0.6 MG/DL (ref 0–1.2)
BUN SERPL-MCNC: 16 MG/DL (ref 8–23)
BUN/CREAT SERPL: 18 (ref 7–25)
CALCIUM SPEC-SCNC: 9.5 MG/DL (ref 8.6–10.5)
CHLORIDE SERPL-SCNC: 106 MMOL/L (ref 98–107)
CHOLEST SERPL-MCNC: 160 MG/DL (ref 0–200)
CO2 SERPL-SCNC: 23.7 MMOL/L (ref 22–29)
CREAT SERPL-MCNC: 0.89 MG/DL (ref 0.76–1.27)
DEPRECATED RDW RBC AUTO: 43 FL (ref 37–54)
EGFRCR SERPLBLD CKD-EPI 2021: 96.9 ML/MIN/1.73
EOSINOPHIL # BLD AUTO: 0.1 10*3/MM3 (ref 0–0.4)
EOSINOPHIL NFR BLD AUTO: 1.9 % (ref 0.3–6.2)
ERYTHROCYTE [DISTWIDTH] IN BLOOD BY AUTOMATED COUNT: 13.4 % (ref 12.3–15.4)
GLOBULIN UR ELPH-MCNC: 3.3 GM/DL
GLUCOSE SERPL-MCNC: 91 MG/DL (ref 65–99)
HBA1C MFR BLD: 4.8 % (ref 4.8–5.6)
HCT VFR BLD AUTO: 46.6 % (ref 37.5–51)
HDLC SERPL-MCNC: 38 MG/DL (ref 40–60)
HGB BLD-MCNC: 15.7 G/DL (ref 13–17.7)
IMM GRANULOCYTES # BLD AUTO: 0.01 10*3/MM3 (ref 0–0.05)
IMM GRANULOCYTES NFR BLD AUTO: 0.2 % (ref 0–0.5)
LDLC SERPL CALC-MCNC: 102 MG/DL (ref 0–100)
LDLC/HDLC SERPL: 2.63 {RATIO}
LYMPHOCYTES # BLD AUTO: 1.59 10*3/MM3 (ref 0.7–3.1)
LYMPHOCYTES NFR BLD AUTO: 30 % (ref 19.6–45.3)
MCH RBC QN AUTO: 29.7 PG (ref 26.6–33)
MCHC RBC AUTO-ENTMCNC: 33.7 G/DL (ref 31.5–35.7)
MCV RBC AUTO: 88.3 FL (ref 79–97)
MONOCYTES # BLD AUTO: 0.55 10*3/MM3 (ref 0.1–0.9)
MONOCYTES NFR BLD AUTO: 10.4 % (ref 5–12)
NEUTROPHILS NFR BLD AUTO: 2.94 10*3/MM3 (ref 1.7–7)
NEUTROPHILS NFR BLD AUTO: 55.4 % (ref 42.7–76)
NRBC BLD AUTO-RTO: 0 /100 WBC (ref 0–0.2)
PLATELET # BLD AUTO: 225 10*3/MM3 (ref 140–450)
PMV BLD AUTO: 11.6 FL (ref 6–12)
POTASSIUM SERPL-SCNC: 3.8 MMOL/L (ref 3.5–5.2)
PROT SERPL-MCNC: 7.2 G/DL (ref 6–8.5)
RBC # BLD AUTO: 5.28 10*6/MM3 (ref 4.14–5.8)
SODIUM SERPL-SCNC: 141 MMOL/L (ref 136–145)
TRIGL SERPL-MCNC: 110 MG/DL (ref 0–150)
TSH SERPL DL<=0.05 MIU/L-ACNC: 2.75 UIU/ML (ref 0.27–4.2)
VLDLC SERPL-MCNC: 20 MG/DL (ref 5–40)
WBC NRBC COR # BLD AUTO: 5.3 10*3/MM3 (ref 3.4–10.8)

## 2024-12-09 PROCEDURE — 1160F RVW MEDS BY RX/DR IN RCRD: CPT | Performed by: PHYSICIAN ASSISTANT

## 2024-12-09 PROCEDURE — 83036 HEMOGLOBIN GLYCOSYLATED A1C: CPT | Performed by: PHYSICIAN ASSISTANT

## 2024-12-09 PROCEDURE — 1159F MED LIST DOCD IN RCRD: CPT | Performed by: PHYSICIAN ASSISTANT

## 2024-12-09 PROCEDURE — 3074F SYST BP LT 130 MM HG: CPT | Performed by: PHYSICIAN ASSISTANT

## 2024-12-09 PROCEDURE — 3079F DIAST BP 80-89 MM HG: CPT | Performed by: PHYSICIAN ASSISTANT

## 2024-12-09 PROCEDURE — 99214 OFFICE O/P EST MOD 30 MIN: CPT | Performed by: PHYSICIAN ASSISTANT

## 2024-12-09 PROCEDURE — 84443 ASSAY THYROID STIM HORMONE: CPT | Performed by: PHYSICIAN ASSISTANT

## 2024-12-09 PROCEDURE — 80053 COMPREHEN METABOLIC PANEL: CPT | Performed by: PHYSICIAN ASSISTANT

## 2024-12-09 PROCEDURE — 80061 LIPID PANEL: CPT | Performed by: PHYSICIAN ASSISTANT

## 2024-12-09 PROCEDURE — 85025 COMPLETE CBC W/AUTO DIFF WBC: CPT | Performed by: PHYSICIAN ASSISTANT

## 2024-12-09 RX ORDER — CLOPIDOGREL BISULFATE 75 MG/1
75 TABLET ORAL DAILY
Qty: 90 TABLET | Refills: 1 | Status: SHIPPED | OUTPATIENT
Start: 2024-12-09

## 2024-12-09 RX ORDER — CHOLECALCIFEROL (VITAMIN D3) 25 MCG
2000 TABLET ORAL DAILY
Qty: 180 TABLET | Refills: 1 | Status: SHIPPED | OUTPATIENT
Start: 2024-12-09

## 2024-12-09 RX ORDER — ATORVASTATIN CALCIUM 40 MG/1
40 TABLET, FILM COATED ORAL DAILY
Qty: 90 TABLET | Refills: 1 | Status: SHIPPED | OUTPATIENT
Start: 2024-12-09

## 2024-12-09 RX ORDER — ASPIRIN 81 MG/1
81 TABLET ORAL DAILY
Qty: 90 TABLET | Refills: 1 | Status: SHIPPED | OUTPATIENT
Start: 2024-12-09 | End: 2024-12-10

## 2024-12-09 RX ORDER — POLYETHYLENE GLYCOL 3350 17 G/17G
17 POWDER, FOR SOLUTION ORAL DAILY
Qty: 30 EACH | Refills: 2 | Status: SHIPPED | OUTPATIENT
Start: 2024-12-09

## 2024-12-09 RX ORDER — OLANZAPINE 5 MG/1
5 TABLET ORAL NIGHTLY
Qty: 90 TABLET | Refills: 1 | Status: SHIPPED | OUTPATIENT
Start: 2024-12-09

## 2024-12-09 RX ORDER — AMLODIPINE BESYLATE 2.5 MG/1
2.5 TABLET ORAL DAILY
Qty: 90 TABLET | Refills: 1 | Status: SHIPPED | OUTPATIENT
Start: 2024-12-09

## 2024-12-09 NOTE — ASSESSMENT & PLAN NOTE
Discussed constipation. Increase water intake and fiber in diet (fresh fruits and vegetables). Will start daily miralax to help prevent constipation. Discussed need to rtc if no improvement with conservative treatment, if symptoms worsen, or if patient had blood in stool. Pt declines colonoscopy for screening, would like cologaurd. Pt and caregiver understands and agrees

## 2024-12-09 NOTE — PROGRESS NOTES
Chief Complaint  Hypertension, Anxiety, and History of CVA    Subjective          Jamshid Carrero presents to [facility]  History of Present Illness  Pt here for follow up   HTN: denies chest pain, palpitations, ha, dizziness  Has not been able to get new wheelchair or shower chair yet  Pt has been drinking Ensure 1-2 times/day  Constipation: once/wk   Stool is very hard  Pt has to strain   Denies blood in stool  Denies vomiting   Anxiety: has improved with current medicine  No longer having mood swings  He gets emotional at times like when he saw his daughter for the first time at [facility] but otherwise appropriate       Past Medical History:   Diagnosis Date    Dementia     Hyperlipidemia     Stroke         History reviewed. No pertinent surgical history.     Current Outpatient Medications on File Prior to Visit   Medication Sig Dispense Refill    Nutritional Supplements (Ensure Original) liquid Take 1 each by mouth Daily. 237 mL 12    [DISCONTINUED] amLODIPine (NORVASC) 2.5 MG tablet Take 1 tablet by mouth Daily. 90 tablet 1    [DISCONTINUED] aspirin 81 MG EC tablet Take 1 tablet by mouth Daily. 90 tablet 1    [DISCONTINUED] atorvastatin (LIPITOR) 40 MG tablet Take 1 tablet by mouth Daily. 90 tablet 1    [DISCONTINUED] Cholecalciferol 25 MCG (1000 UT) tablet Take 2 tablets by mouth Daily. 180 tablet 1    [DISCONTINUED] clopidogrel (PLAVIX) 75 MG tablet Take 1 tablet by mouth Daily. 90 tablet 1    [DISCONTINUED] OLANZapine (ZyPREXA) 5 MG tablet Take 1 tablet by mouth Every Night. 90 tablet 1    [DISCONTINUED] sertraline (ZOLOFT) 50 MG tablet Take 1 tablet by mouth Daily for 180 days. 90 tablet 1     No current facility-administered medications on file prior to visit.        Allergies   Allergen Reactions    Codeine Shortness Of Breath       Social History     Tobacco Use   Smoking Status Never   Smokeless Tobacco Never          Objective   Vital Signs:   BP  "108/80 (BP Location: Left arm, Patient Position: Sitting, Cuff Size: Adult)   Pulse 63   Temp 97.5 °F (36.4 °C) (Temporal)   Resp 16   Ht 182.9 cm (72\")   Wt 83.9 kg (185 lb)   SpO2 94%   BMI 25.09 kg/m²     Physical Exam  Vitals reviewed.   Constitutional:       Appearance: Normal appearance.   HENT:      Head: Normocephalic and atraumatic.      Nose: Nose normal.      Mouth/Throat:      Mouth: Mucous membranes are moist.   Eyes:      Extraocular Movements: Extraocular movements intact.      Conjunctiva/sclera: Conjunctivae normal.      Pupils: Pupils are equal, round, and reactive to light.   Cardiovascular:      Rate and Rhythm: Normal rate and regular rhythm.   Pulmonary:      Effort: Pulmonary effort is normal.      Breath sounds: Normal breath sounds.   Abdominal:      General: Abdomen is flat. Bowel sounds are normal.      Palpations: Abdomen is soft.   Musculoskeletal:         General: Normal range of motion.   Neurological:      General: No focal deficit present.      Mental Status: He is alert and oriented to person, place, and time.   Psychiatric:         Mood and Affect: Mood normal.        Result Review :                           Assessment and Plan    Diagnoses and all orders for this visit:    1. Essential hypertension (Primary)  Assessment & Plan:  Hypertension is stable and controlled  Continue current treatment regimen.  Blood pressure will be reassessed  at f/u .    Orders:  -     Comprehensive Metabolic Panel  -     CBC & Differential  -     TSH    2. Hyperlipidemia, unspecified hyperlipidemia type  -     Lipid Panel    3. Hyperglycemia    4. Anxiety  Assessment & Plan:  Improved, will cont current dose of medicines.    Orders:  -     sertraline (ZOLOFT) 50 MG tablet; Take 1 tablet by mouth Daily for 180 days.  Dispense: 90 tablet; Refill: 1    5. Constipation, unspecified constipation type  Assessment & Plan:  Discussed constipation. Increase water intake and fiber in diet (fresh fruits " and vegetables). Will start daily miralax to help prevent constipation. Discussed need to rtc if no improvement with conservative treatment, if symptoms worsen, or if patient had blood in stool. Pt declines colonoscopy for screening, would like cologaurd. Pt and caregiver understands and agrees        6. Screen for colon cancer  -     Cologuard - Stool, Per Rectum; Future    Other orders  -     OLANZapine (ZyPREXA) 5 MG tablet; Take 1 tablet by mouth Every Night.  Dispense: 90 tablet; Refill: 1  -     clopidogrel (PLAVIX) 75 MG tablet; Take 1 tablet by mouth Daily.  Dispense: 90 tablet; Refill: 1  -     Cholecalciferol 25 MCG (1000 UT) tablet; Take 2 tablets by mouth Daily.  Dispense: 180 tablet; Refill: 1  -     atorvastatin (LIPITOR) 40 MG tablet; Take 1 tablet by mouth Daily.  Dispense: 90 tablet; Refill: 1  -     aspirin 81 MG EC tablet; Take 1 tablet by mouth Daily.  Dispense: 90 tablet; Refill: 1  -     amLODIPine (NORVASC) 2.5 MG tablet; Take 1 tablet by mouth Daily.  Dispense: 90 tablet; Refill: 1  -     polyethylene glycol (MIRALAX) 17 g packet; Take 17 g by mouth Daily.  Dispense: 30 each; Refill: 2        Follow Up   Return in about 4 months (around 4/9/2025), or if symptoms worsen or fail to improve.  Patient was given instructions and counseling regarding his condition or for health maintenance advice. Please see specific information pulled into the AVS if appropriate.

## 2024-12-09 NOTE — OUTREACH NOTE
AMBULATORY CASE MANAGEMENT NOTE    Names and Relationships of Patient/Support Persons: Contact: PCP; Relationship:  -     Care Coordination    I spoke with the PCP in person. Caregiver informed PCP that the shower chair that was ordered through [employer] is going to cost them $60. If this is accurate they think they may be able to get it covered through their  with [employer].    I called and left a VM for Nini Rios, the [employer] rep.    Care Coordination    I spoke with Nini Rios from [employer]. Insurance will not cover the shower chair. Caregiver was accurate regarding having to pay out of pocket for it if they get it.    CCM Interim Update    I spoke on the phone with the caregiver, Adis, regarding the above. She thinks it's possible if PCP provides a script for the shower chair that the patient's  with [employer] can get it covered.    I called the , Chayo, with [employer] at [phone] and left a VM. I am awaiting a return call.      Desi FLOWERS  Ambulatory Case Management    12/9/2024, 12:20 EST

## 2024-12-09 NOTE — ASSESSMENT & PLAN NOTE
Hypertension is stable and controlled  Continue current treatment regimen.  Blood pressure will be reassessed  at f/u .

## 2024-12-10 ENCOUNTER — PATIENT OUTREACH (OUTPATIENT)
Dept: CASE MANAGEMENT | Facility: OTHER | Age: 64
End: 2024-12-10
Payer: MEDICAID

## 2024-12-10 ENCOUNTER — PATIENT ROUNDING (BHMG ONLY) (OUTPATIENT)
Dept: NEUROLOGY | Facility: CLINIC | Age: 64
End: 2024-12-10
Payer: MEDICAID

## 2024-12-10 ENCOUNTER — OFFICE VISIT (OUTPATIENT)
Dept: NEUROLOGY | Facility: CLINIC | Age: 64
End: 2024-12-10
Payer: MEDICAID

## 2024-12-10 VITALS
WEIGHT: 185 LBS | HEIGHT: 72 IN | DIASTOLIC BLOOD PRESSURE: 71 MMHG | HEART RATE: 54 BPM | SYSTOLIC BLOOD PRESSURE: 108 MMHG | BODY MASS INDEX: 25.06 KG/M2

## 2024-12-10 DIAGNOSIS — F01.B3: Primary | ICD-10-CM

## 2024-12-10 DIAGNOSIS — I63.9 CEREBROVASCULAR ACCIDENT (CVA), UNSPECIFIED MECHANISM: Primary | ICD-10-CM

## 2024-12-10 PROBLEM — G25.9 MOVEMENT DISORDER: Status: RESOLVED | Noted: 2024-02-09 | Resolved: 2024-12-10

## 2024-12-10 PROCEDURE — 1159F MED LIST DOCD IN RCRD: CPT | Performed by: PSYCHIATRY & NEUROLOGY

## 2024-12-10 PROCEDURE — 1160F RVW MEDS BY RX/DR IN RCRD: CPT | Performed by: PSYCHIATRY & NEUROLOGY

## 2024-12-10 PROCEDURE — 99204 OFFICE O/P NEW MOD 45 MIN: CPT | Performed by: PSYCHIATRY & NEUROLOGY

## 2024-12-10 PROCEDURE — 3074F SYST BP LT 130 MM HG: CPT | Performed by: PSYCHIATRY & NEUROLOGY

## 2024-12-10 PROCEDURE — 3078F DIAST BP <80 MM HG: CPT | Performed by: PSYCHIATRY & NEUROLOGY

## 2024-12-10 NOTE — OUTREACH NOTE
AMBULATORY CASE MANAGEMENT NOTE    Names and Relationships of Patient/Support Persons: Contact: PCP; Relationship:   Contact: Jackie-Jessa; Relationship: Other -     Care Coordination    Jackie Germain came to office to drop off a Mobility Evaluation Form to be completed and signed by PCP. PCP is not in the office today. This will be followed up on tomorrow. I filled out the paperwork.    Desi FLOWERS  Ambulatory Case Management    12/10/2024, 12:14 EST

## 2024-12-10 NOTE — ASSESSMENT & PLAN NOTE
I discussed with the caregiver as well as the patient that he has multi-infarct dementia and that no further testing is needed at this point since he is being assisted with all activities of daily living.  I will discontinue aspirin is to continue taking Plavix.  I will see him in the future as needed.  Thank for letting me participate in his care.

## 2024-12-10 NOTE — PROGRESS NOTES
"Chief Complaint  Memory Loss and Neurologic Problem (Hx of CVA)    Subjective          Jamshid Carrero is a 64 y.o. male who presents to [facility]  History of Present Illness  64-year-old man evaluated for a history of stroke.  He had a CT scan of the brain was performed in April of this year showing volume loss more than expected for patient's age moderate in degree.  A large amount of low-density periventricular and subcortical white matter.  This is nonspecific but most commonly seen with chronic small vessel ischemic change.  Remote lacunar infarcts as described.  Patient has a history of stroke since 2019.  He is here with 2 caregivers.  He needs assistance for all activities of daily living except for eating.  He has dementia although they tell me that he is sharp.  The caregivers volunteered to take care of him when he was living in [place] with his daughter for 8 to 9 months and prior to that he was living in a nursing home.  He was seeing another caregiver before and the caregiver left him in the hospital and abandoned him.  He uses a walker since 2019.  He has pull-ups and he sold himself but sometimes he goes to the toilet.  He needs assistance for bathing, dressing and toileting.  He is using a walker to walk but rarely.  He sleeps in the daytime and stays up at night.    He is on dual antiplatelet therapy.  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  Has been taking this for over a year.  He has no extracranial stenosis.  He is also taking atorvastatin.    Objective   Vital Signs:   /71 (BP Location: Right arm, Patient Position: Sitting, Cuff Size: Adult)   Pulse 54   Ht 182.9 cm (72.01\")   Wt 83.9 kg (185 lb)   BMI 25.08 kg/m²     Physical Exam   He is alert, dysarthric and has significant amount of pseudobulbar symptoms.  He is able to tell me the year, the season but not the day the date and the month.  He can tell " me that hospital, the floor and the city but not the county.  Immediate memory is 3 out of 3, recent memory is 1 out of 3.  He is able to spell his name backwards.  He can name objects.,  EOMs full directions gaze, facial strength there is bilateral facial weakness, soft palate elevation and tongue are normal.  There is no weakness on individual muscle testing but there is limited range of motion the left shoulder.  Fine finger movements are intact.  He has weakness in the lower extremity and needs assistance of 2 to get up.  Reflexes are absent.  He is in a wheelchair.  He is unable to take steps as start hesitation and shuffling steps.  Heart is regular rhythm normal in rate.        Assessment and Plan  Diagnoses and all orders for this visit:    1. Moderate multi-infarct dementia with mood disturbance (Primary)  Assessment & Plan:  I discussed with the caregiver as well as the patient that he has multi-infarct dementia and that no further testing is needed at this point since he is being assisted with all activities of daily living.  I will discontinue aspirin is to continue taking Plavix.  I will see him in the future as needed.  Thank for letting me participate in his care.           Total time spent with the patient and coordinating patient care was 45 minutes.    Follow Up  No follow-ups on file.  Patient was given instructions and counseling regarding his condition or for health maintenance advice. Please see specific information pulled into the AVS if appropriate.

## 2024-12-11 ENCOUNTER — PATIENT OUTREACH (OUTPATIENT)
Dept: CASE MANAGEMENT | Facility: OTHER | Age: 64
End: 2024-12-11
Payer: MEDICAID

## 2024-12-11 DIAGNOSIS — I63.9 CEREBROVASCULAR ACCIDENT (CVA), UNSPECIFIED MECHANISM: Primary | ICD-10-CM

## 2024-12-11 NOTE — OUTREACH NOTE
AMBULATORY CASE MANAGEMENT NOTE    Names and Relationships of Patient/Support Persons:  -     Care Coordination    I received [facility] Mobility Evaluation Form from provider with signature. I had the front office staff scan it in to the patient's chart. I faxed it to Jessa with [employer], office notes to [phone].    Desi FLOWERS  Ambulatory Case Management    12/11/2024, 10:35 EST

## 2024-12-31 ENCOUNTER — PATIENT OUTREACH (OUTPATIENT)
Dept: CASE MANAGEMENT | Facility: OTHER | Age: 64
End: 2024-12-31
Payer: MEDICAID

## 2024-12-31 DIAGNOSIS — I10 ESSENTIAL HYPERTENSION: ICD-10-CM

## 2024-12-31 DIAGNOSIS — I63.9 CEREBROVASCULAR ACCIDENT (CVA), UNSPECIFIED MECHANISM: Primary | ICD-10-CM

## 2024-12-31 NOTE — OUTREACH NOTE
AMBULATORY CASE MANAGEMENT NOTE    Names and Relationships of Patient/Support Persons:  -     [facility] End of Month Documentation    This Chronic Medical Management Care Plan for Jamshid Carrero, 64 y.o. male, has been monitored and managed and a new plan of care implemented for the month of December.  A cumulative time of 125  minutes was spent on this patient record this month, including phone call with care giver; chart review; electronic communication with primary care provider; electronic communication with other providers, trying to determine if WC has been ordered, talking with [employer], communicating with PCP, MA, talking with caregiver.    Regarding the patient's problems: has Antifreeze poisoning; CVA, old, dysarthria; Hyperlipidemia; Elevated glucose; Dysarthria; Essential hypertension; Junctional bradycardia; Moderate multi-infarct dementia with mood disturbance; Anxiety; Dementia with behavioral disturbance; Irritability and anger; Physical deconditioning; and Constipation on their problem list., the following items were addressed: medical records; medications and any changes can be found within the plan section of the note.  A detailed listing of time spent for chronic care management is tracked within each outreach encounter.  Current medications include:  has a current medication list which includes the following prescription(s): amlodipine, atorvastatin, cholecalciferol, clopidogrel, ensure original, olanzapine, polyethylene glycol, and sertraline. and the patient is reported to be patient is compliant with medication protocol,  Medications are reported to be effective.  Regarding these diagnoses, referrals were made to the following provider(s):  N/A.  All notes on chart for PCP to review.    The patient was monitored remotely for blood pressure; activity level; mood & behavior; medications.    The patient's physical needs include:  help taking medications as prescribed; physical healthcare;  medication education; physician referral; DME supplies; needs assistance with ADLs; resources for disability needs, still waiting on WC, also needs walker and a shower chair.     The patient's mental support needs include:  continued support, takes Zoloft presently    The patient's cognitive support needs include:  medication; increased support; coordination of community providers; needs assistance with ADLs; health care; caregiver, patient has a caregiver named Adis    The patient's psychosocial support needs include:  need for increased support; medication management or adherence; coordination of community providers    The patient's functional needs include: DME; needs assistance for ADLs; physical healthcare; medication education; resources for disability needs; physician referral, Needs WC, walker and shower chair    The patient's environmental needs include:  resources for disability needs    Care Plan overall comments:  Ongoing    Refer to previous outreach notes for more information on the areas listed above.    Monthly Billing Diagnoses  (I63.9) Cerebrovascular accident (CVA), unspecified mechanism    (I10) Essential hypertension    Medications   Medications have been reconciled    Care Plan progress this month:      Recently Modified Care Plans Updates made since 11/30/2024 12:00 AM      No recently modified care plans.               Current Specialty Plan of Care Status signed by both patient and provider    Instructions   Patient was provided an electronic copy of care plan  CCM services were explained and offered and patient has accepted these services.  Patient has given their written consent to receive CCM services and understands that this includes the authorization of electronic communication of medical information with the other treating providers.  Patient understands that they may stop CCM services at any time and these changes will be effective at the end of the calendar month and will  effectively revocate the agreement of CCM services.  Patient understands that only one practitioner can furnish and be paid for CCM services during one calendar month.  Patient also understands that there may be co-payment or deductible fees in association with CCM services.  Patient will continue with at least monthly follow-up calls with the Ambulatory .    Desi FLOWERS  Ambulatory Case Management    12/31/2024, 12:09 EST    Education Documentation  No documentation found.        Desi FLOWERS  Ambulatory Case Management    12/31/2024, 12:09 EST

## 2025-01-17 ENCOUNTER — TELEPHONE (OUTPATIENT)
Dept: CASE MANAGEMENT | Facility: OTHER | Age: 65
End: 2025-01-17
Payer: MEDICAID

## 2025-01-17 ENCOUNTER — PATIENT OUTREACH (OUTPATIENT)
Dept: CASE MANAGEMENT | Facility: OTHER | Age: 65
End: 2025-01-17
Payer: MEDICAID

## 2025-01-17 DIAGNOSIS — I63.9 CEREBROVASCULAR ACCIDENT (CVA), UNSPECIFIED MECHANISM: Primary | ICD-10-CM

## 2025-01-17 NOTE — OUTREACH NOTE
"AMBULATORY CASE MANAGEMENT NOTE    Names and Relationships of Patient/Support Persons: Contact: ADIS PETERSON; Relationship: Emergency Contact -     [facility] Interim Update    I spoke with Adis, Jamshid's caregiver, on the phone today. She stated that overall, the patient is doing very well.    He was seen by neurology, who told them that he does not need to follow up with him because there are no interventions he can do to improve the patient's health. He did stop the patient's ASA and told him to continue the Plavix. This was confirmed by reading neurology's office note. Caregiver was still giving the patient ASA. I read neurology's note to her to stop the ASA. She verbalized understanding.    Adis states that patient is in need of a hospital bed mattress.    She also states that [employer] is coming to the house to \"fit\" the patient for a new WC. They are excited about this.    Adis states they have no further needs at this time. Gela established with patient. Patient's caregiver has my direct number to call with further questions or concerns.    Desi FLOWERS  Ambulatory Case Management    1/17/2025, 13:15 EST  "

## 2025-01-17 NOTE — TELEPHONE ENCOUNTER
"Patient's caregiver is requesting a hospital bed mattress to be ordered for patient. They have a \"good\" hospital bed. The previous mattress was a \"regular twin mattress\" but it got ruined when the patient recently had a stomach virus.    I have pended the order. Please sign, if agreeable.    Thanks,  Desi  "

## 2025-01-31 ENCOUNTER — PATIENT OUTREACH (OUTPATIENT)
Dept: CASE MANAGEMENT | Facility: OTHER | Age: 65
End: 2025-01-31
Payer: MEDICAID

## 2025-01-31 DIAGNOSIS — I10 ESSENTIAL HYPERTENSION: ICD-10-CM

## 2025-01-31 DIAGNOSIS — I63.9 CEREBROVASCULAR ACCIDENT (CVA), UNSPECIFIED MECHANISM: Primary | ICD-10-CM

## 2025-01-31 NOTE — OUTREACH NOTE
AMBULATORY CASE MANAGEMENT NOTE    Names and Relationships of Patient/Support Persons:  -     [facility] End of Month Documentation    This Chronic Medical Management Care Plan for Jamshid Carrero, 64 y.o. male, has been monitored and managed; reviewed; revised and a new plan of care implemented for the month of January.  A cumulative time of 31  minutes was spent on this patient record this month, including phone call with relative; chart review; electronic communication with primary care provider; electronic communication with other providers, talking with caregiver, communicating with PCP regarding DME, ordering DME, medication reconciliation.    Regarding the patient's problems: has Antifreeze poisoning; CVA, old, dysarthria; Hyperlipidemia; Elevated glucose; Dysarthria; Essential hypertension; Junctional bradycardia; Moderate multi-infarct dementia with mood disturbance; Anxiety; Dementia with behavioral disturbance; Irritability and anger; Physical deconditioning; and Constipation on their problem list., the following items were addressed: medical records; medications; referrals to community service providers, Patient participating in the waiver program with [employer] and any changes can be found within the plan section of the note.  A detailed listing of time spent for chronic care management is tracked within each outreach encounter.  Current medications include:  has a current medication list which includes the following prescription(s): amlodipine, atorvastatin, cholecalciferol, clopidogrel, ensure original, olanzapine, and sertraline. and the patient is reported to be patient is compliant with medication protocol, Med list reviewed,  Medications are reported to be effective, blood pressure well controlled with medications.  Regarding these diagnoses, referrals were made to the following provider(s):  DME.  All notes on chart for PCP to review.    The patient was monitored remotely for blood pressure; activity  level; mood & behavior; medications.    The patient's physical needs include:  help taking medications as prescribed; physical healthcare; medication education; physician referral; DME supplies; needs assistance with ADLs; resources for disability needs, WC fitting scheduled for 1/29, needs hospital bed mattress.     The patient's mental support needs include:  continued support, takes Zoloft presently    The patient's cognitive support needs include:  medication; coordination of community providers; needs assistance with ADLs; health care; caregiver; continued support; requires supervision, patient has a caregiver named Adis    The patient's psychosocial support needs include:  medication management or adherence; coordination of community providers; continued support    The patient's functional needs include: DME; needs assistance for ADLs; physical healthcare; medication education; resources for disability needs; physician referral    The patient's environmental needs include:  resources for disability needs, Wheelchair & hospital bed mattress    Care Plan overall comments:  Revised and Ongoing    Refer to previous outreach notes for more information on the areas listed above.    Monthly Billing Diagnoses  (I63.9) Cerebrovascular accident (CVA), unspecified mechanism    (I10) Essential hypertension    Medications   Medications have been reconciled    Care Plan progress this month:      Recently Modified Care Plans Updates made since 12/31/2024 12:00 AM      No recently modified care plans.               Current Specialty Plan of Care Status signed by both patient and provider    Instructions   Patient was provided an electronic copy of care plan  CCM services were explained and offered and patient has accepted these services.  Patient has given their written consent to receive CCM services and understands that this includes the authorization of electronic communication of medical information with the other  treating providers.  Patient understands that they may stop CCM services at any time and these changes will be effective at the end of the calendar month and will effectively revocate the agreement of CCM services.  Patient understands that only one practitioner can furnish and be paid for CCM services during one calendar month.  Patient also understands that there may be co-payment or deductible fees in association with CCM services.  Patient will continue with at least monthly follow-up calls with the Ambulatory .    Desi FLOWERS  Ambulatory Case Management    1/31/2025, 10:22 EST    Education Documentation  No documentation found.        Desi FLOWERS  Ambulatory Case Management    1/31/2025, 10:22 EST

## 2025-02-11 ENCOUNTER — PATIENT OUTREACH (OUTPATIENT)
Dept: CASE MANAGEMENT | Facility: OTHER | Age: 65
End: 2025-02-11
Payer: MEDICAID

## 2025-02-11 DIAGNOSIS — I63.9 CEREBROVASCULAR ACCIDENT (CVA), UNSPECIFIED MECHANISM: Primary | ICD-10-CM

## 2025-02-11 NOTE — OUTREACH NOTE
AMBULATORY CASE MANAGEMENT NOTE    Names and Relationships of Patient/Support Persons: Contact: ELSY PETERSON; Relationship: Emergency Contact -     Care Coordination    Through electronic communication with Arab, Count includes the [facility] stated that the caregiver would need to call [phone] to set up the delivery of the hospital mattress.    Patient Outreach    I called and gave the above information to the patient's caregiver, Elsy. She is agreeable.    Desi FLOWERS  Ambulatory Case Management    2/11/2025, 15:04 EST

## 2025-02-11 NOTE — OUTREACH NOTE
AMBULATORY CASE MANAGEMENT NOTE    Names and Relationships of Patient/Support Persons: Contact: ELSY PETERSON; Relationship: Emergency Contact -     [facility] Interim Update    Patient's caregiver, Elsy, called to inform me that they have not received the mattress from [employer] that was ordered several weeks ago.    Care Coordination    According to [facility], it had several expected delivery dates but no explanation as to why it wasn't delivered on those dates. The next delivery date showing is 2/17. I sent [employer] a message through [email or web address] for them to reach out to the caregiver to discuss a delivery date.    I informed Elsy of this while we were on the phone. She is agreeable to this and will contact me if she doesn't hear from them today.    Desi FLOWERS  Ambulatory Case Management    2/11/2025, 08:38 EST

## 2025-02-28 ENCOUNTER — PATIENT OUTREACH (OUTPATIENT)
Dept: CASE MANAGEMENT | Facility: OTHER | Age: 65
End: 2025-02-28
Payer: MEDICAID

## 2025-02-28 DIAGNOSIS — I63.9 CEREBROVASCULAR ACCIDENT (CVA), UNSPECIFIED MECHANISM: Primary | ICD-10-CM

## 2025-02-28 DIAGNOSIS — I10 ESSENTIAL HYPERTENSION: ICD-10-CM

## 2025-02-28 NOTE — OUTREACH NOTE
[facility] End of Month Documentation    This Chronic Medical Management Care Plan for Jamshid Carrero, 64 y.o. male, has been monitored and managed and a new plan of care implemented for the month of February.  A cumulative time of 22  minutes was spent on this patient record this month, including phone call with care giver; electronic communication with other providers; chart review.    Regarding the patient's problems: has Antifreeze poisoning; CVA, old, dysarthria; Hyperlipidemia; Elevated glucose; Dysarthria; Essential hypertension; Junctional bradycardia; Moderate multi-infarct dementia with mood disturbance; Anxiety; Dementia with behavioral disturbance; Irritability and anger; Physical deconditioning; and Constipation on their problem list., the following items were addressed: medical records; medications; changes to medical care, Patient participating in the waiver program with [employer] and any changes can be found within the plan section of the note.  A detailed listing of time spent for chronic care management is tracked within each outreach encounter.  Current medications include:  has a current medication list which includes the following prescription(s): amlodipine, atorvastatin, cholecalciferol, clopidogrel, ensure original, olanzapine, and sertraline. and the patient is reported to be patient is compliant with medication protocol,  Medications are reported to be effective.  Regarding these diagnoses, referrals were made to the following provider(s):  N/A.  All notes on chart for PCP to review.    The patient was monitored remotely for blood pressure; activity level; mood & behavior; medications.    The patient's physical needs include:  help taking medications as prescribed; physical healthcare; medication education; physician referral; DME supplies; needs assistance with ADLs; resources for disability needs, hospital bed mattress.     The patient's mental support needs include:  continued support, takes  Zoloft presently    The patient's cognitive support needs include:  medication; coordination of community providers; needs assistance with ADLs; health care; caregiver; continued support; requires supervision, patient has a caregiver named Adis    The patient's psychosocial support needs include:  medication management or adherence; coordination of community providers; continued support    The patient's functional needs include: DME; needs assistance for ADLs; physical healthcare; medication education; resources for disability needs; physician referral, Needs hospital bed    The patient's environmental needs include:  resources for disability needs    Care Plan overall comments:  Ongoing    Refer to previous outreach notes for more information on the areas listed above.    Monthly Billing Diagnoses  (I63.9) Cerebrovascular accident (CVA), unspecified mechanism    (I10) Essential hypertension    Medications   Medications have been reconciled    Care Plan progress this month:      Recently Modified Care Plans Updates made since 1/28/2025 12:00 AM      No recently modified care plans.               Current Specialty Plan of Care Status signed by both patient and provider    Instructions   Patient was provided an electronic copy of care plan  CCM services were explained and offered and patient has accepted these services.  Patient has given their written consent to receive CCM services and understands that this includes the authorization of electronic communication of medical information with the other treating providers.  Patient understands that they may stop CCM services at any time and these changes will be effective at the end of the calendar month and will effectively revocate the agreement of CCM services.  Patient understands that only one practitioner can furnish and be paid for CCM services during one calendar month.  Patient also understands that there may be co-payment or deductible fees in association with  CCM services.  Patient will continue with at least monthly follow-up calls with the Ambulatory .    Desi FLOWERS  Ambulatory Case Management    2/28/2025, 11:38 EST

## 2025-03-11 ENCOUNTER — TELEPHONE (OUTPATIENT)
Dept: CASE MANAGEMENT | Facility: OTHER | Age: 65
End: 2025-03-11
Payer: MEDICAID

## 2025-03-12 ENCOUNTER — PATIENT OUTREACH (OUTPATIENT)
Dept: CASE MANAGEMENT | Facility: OTHER | Age: 65
End: 2025-03-12
Payer: MEDICAID

## 2025-03-12 DIAGNOSIS — I63.9 CEREBROVASCULAR ACCIDENT (CVA), UNSPECIFIED MECHANISM: Primary | ICD-10-CM

## 2025-03-12 NOTE — OUTREACH NOTE
AMBULATORY CASE MANAGEMENT NOTE    Names and Relationships of Patient/Support Persons: Contact: ELSY PETERSON; Relationship: Emergency Contact -     [facility] Interim Update    I spoke with patient's caregiver, Elsy, on the phone. She stated that they received the hospital bed mattress and are pleased with it. She stated the patient has no other needs at this time.    We have decided to place patient in Monitoring for graduation. She is agreeable to this. She has my contact information to reach me for any needs that may arise.    Desi FLOWERS  Ambulatory Case Management    3/12/2025, 10:26 EDT

## 2025-04-11 ENCOUNTER — TELEPHONE (OUTPATIENT)
Dept: CASE MANAGEMENT | Facility: OTHER | Age: 65
End: 2025-04-11
Payer: MEDICAID

## 2025-04-11 NOTE — TELEPHONE ENCOUNTER
30 Day Chart Review for graduation- Patient has had no UC/ER visits since last outreach. He has also not been hospitalized. He has a follow up appointment with PCP scheduled. There appears to be no needs at this time.

## 2025-04-14 DIAGNOSIS — E44.1 MILD PROTEIN-CALORIE MALNUTRITION: ICD-10-CM

## 2025-04-14 DIAGNOSIS — R53.81 PHYSICAL DECONDITIONING: ICD-10-CM

## 2025-04-14 RX ORDER — LACTOSE-REDUCED FOOD
1 LIQUID (ML) ORAL DAILY
Qty: 237 ML | Refills: 12 | Status: SHIPPED | OUTPATIENT
Start: 2025-04-14

## 2025-05-15 ENCOUNTER — TELEPHONE (OUTPATIENT)
Dept: CASE MANAGEMENT | Facility: OTHER | Age: 65
End: 2025-05-15
Payer: MEDICAID

## 2025-05-15 NOTE — TELEPHONE ENCOUNTER
60 day Chart Review for Monitoring for graduation. No UC/ER visits. No hospitalizations. Patient has a follow up appt scheduled with PCP. Appears to be no needs at this time.

## 2025-05-30 ENCOUNTER — OFFICE VISIT (OUTPATIENT)
Dept: INTERNAL MEDICINE | Facility: CLINIC | Age: 65
End: 2025-05-30
Payer: MEDICAID

## 2025-05-30 VITALS
WEIGHT: 183 LBS | DIASTOLIC BLOOD PRESSURE: 72 MMHG | TEMPERATURE: 97.7 F | OXYGEN SATURATION: 97 % | RESPIRATION RATE: 16 BRPM | HEIGHT: 72 IN | HEART RATE: 65 BPM | BODY MASS INDEX: 24.79 KG/M2 | SYSTOLIC BLOOD PRESSURE: 108 MMHG

## 2025-05-30 DIAGNOSIS — F03.918 DEMENTIA WITH BEHAVIORAL DISTURBANCE: ICD-10-CM

## 2025-05-30 DIAGNOSIS — Z00.00 ANNUAL PHYSICAL EXAM: Primary | ICD-10-CM

## 2025-05-30 DIAGNOSIS — E44.1 MILD PROTEIN-CALORIE MALNUTRITION: ICD-10-CM

## 2025-05-30 DIAGNOSIS — E78.5 HYPERLIPIDEMIA, UNSPECIFIED HYPERLIPIDEMIA TYPE: ICD-10-CM

## 2025-05-30 DIAGNOSIS — Z86.73 HISTORY OF CVA (CEREBROVASCULAR ACCIDENT): ICD-10-CM

## 2025-05-30 DIAGNOSIS — F41.9 ANXIETY: ICD-10-CM

## 2025-05-30 DIAGNOSIS — I10 ESSENTIAL HYPERTENSION: ICD-10-CM

## 2025-05-30 DIAGNOSIS — R53.81 PHYSICAL DECONDITIONING: ICD-10-CM

## 2025-05-30 DIAGNOSIS — Z12.11 SCREENING FOR COLON CANCER: ICD-10-CM

## 2025-05-30 LAB
ALBUMIN SERPL-MCNC: 3.9 G/DL (ref 3.5–5.2)
ALBUMIN/GLOB SERPL: 1.2 G/DL
ALP SERPL-CCNC: 80 U/L (ref 39–117)
ALT SERPL W P-5'-P-CCNC: 10 U/L (ref 1–41)
ANION GAP SERPL CALCULATED.3IONS-SCNC: 12.6 MMOL/L (ref 5–15)
AST SERPL-CCNC: 24 U/L (ref 1–40)
BASOPHILS # BLD AUTO: 0.14 10*3/MM3 (ref 0–0.2)
BASOPHILS NFR BLD AUTO: 2.7 % (ref 0–1.5)
BILIRUB SERPL-MCNC: 0.7 MG/DL (ref 0–1.2)
BUN SERPL-MCNC: 14 MG/DL (ref 8–23)
BUN/CREAT SERPL: 15.6 (ref 7–25)
CALCIUM SPEC-SCNC: 9.6 MG/DL (ref 8.6–10.5)
CHLORIDE SERPL-SCNC: 107 MMOL/L (ref 98–107)
CHOLEST SERPL-MCNC: 154 MG/DL (ref 0–200)
CO2 SERPL-SCNC: 24.4 MMOL/L (ref 22–29)
CREAT SERPL-MCNC: 0.9 MG/DL (ref 0.76–1.27)
DEPRECATED RDW RBC AUTO: 44.1 FL (ref 37–54)
EGFRCR SERPLBLD CKD-EPI 2021: 96.6 ML/MIN/1.73
EOSINOPHIL # BLD AUTO: 0.12 10*3/MM3 (ref 0–0.4)
EOSINOPHIL NFR BLD AUTO: 2.3 % (ref 0.3–6.2)
ERYTHROCYTE [DISTWIDTH] IN BLOOD BY AUTOMATED COUNT: 13.3 % (ref 12.3–15.4)
GLOBULIN UR ELPH-MCNC: 3.3 GM/DL
GLUCOSE SERPL-MCNC: 132 MG/DL (ref 65–99)
HCT VFR BLD AUTO: 48.2 % (ref 37.5–51)
HDLC SERPL-MCNC: 37 MG/DL (ref 40–60)
HGB BLD-MCNC: 16.2 G/DL (ref 13–17.7)
IMM GRANULOCYTES # BLD AUTO: 0.07 10*3/MM3 (ref 0–0.05)
IMM GRANULOCYTES NFR BLD AUTO: 1.3 % (ref 0–0.5)
LDLC SERPL CALC-MCNC: 89 MG/DL (ref 0–100)
LDLC/HDLC SERPL: 2.31 {RATIO}
LYMPHOCYTES # BLD AUTO: 1.69 10*3/MM3 (ref 0.7–3.1)
LYMPHOCYTES NFR BLD AUTO: 32 % (ref 19.6–45.3)
MCH RBC QN AUTO: 30.7 PG (ref 26.6–33)
MCHC RBC AUTO-ENTMCNC: 33.6 G/DL (ref 31.5–35.7)
MCV RBC AUTO: 91.3 FL (ref 79–97)
MONOCYTES # BLD AUTO: 0.44 10*3/MM3 (ref 0.1–0.9)
MONOCYTES NFR BLD AUTO: 8.3 % (ref 5–12)
NEUTROPHILS NFR BLD AUTO: 2.82 10*3/MM3 (ref 1.7–7)
NEUTROPHILS NFR BLD AUTO: 53.4 % (ref 42.7–76)
NRBC BLD AUTO-RTO: 0 /100 WBC (ref 0–0.2)
PLATELET # BLD AUTO: 232 10*3/MM3 (ref 140–450)
PMV BLD AUTO: 12.1 FL (ref 6–12)
POTASSIUM SERPL-SCNC: 3.7 MMOL/L (ref 3.5–5.2)
PROT SERPL-MCNC: 7.2 G/DL (ref 6–8.5)
RBC # BLD AUTO: 5.28 10*6/MM3 (ref 4.14–5.8)
SODIUM SERPL-SCNC: 144 MMOL/L (ref 136–145)
TRIGL SERPL-MCNC: 158 MG/DL (ref 0–150)
TSH SERPL DL<=0.05 MIU/L-ACNC: 2.14 UIU/ML (ref 0.27–4.2)
VLDLC SERPL-MCNC: 28 MG/DL (ref 5–40)
WBC NRBC COR # BLD AUTO: 5.28 10*3/MM3 (ref 3.4–10.8)

## 2025-05-30 PROCEDURE — 85025 COMPLETE CBC W/AUTO DIFF WBC: CPT | Performed by: PHYSICIAN ASSISTANT

## 2025-05-30 PROCEDURE — 84443 ASSAY THYROID STIM HORMONE: CPT | Performed by: PHYSICIAN ASSISTANT

## 2025-05-30 PROCEDURE — 80061 LIPID PANEL: CPT | Performed by: PHYSICIAN ASSISTANT

## 2025-05-30 PROCEDURE — 80053 COMPREHEN METABOLIC PANEL: CPT | Performed by: PHYSICIAN ASSISTANT

## 2025-05-30 RX ORDER — OLANZAPINE 5 MG/1
5 TABLET, FILM COATED ORAL NIGHTLY
Qty: 90 TABLET | Refills: 1 | Status: SHIPPED | OUTPATIENT
Start: 2025-05-30

## 2025-05-30 RX ORDER — CLOPIDOGREL BISULFATE 75 MG/1
75 TABLET ORAL DAILY
Qty: 90 TABLET | Refills: 1 | Status: SHIPPED | OUTPATIENT
Start: 2025-05-30

## 2025-05-30 RX ORDER — ATORVASTATIN CALCIUM 40 MG/1
40 TABLET, FILM COATED ORAL DAILY
Qty: 90 TABLET | Refills: 1 | Status: SHIPPED | OUTPATIENT
Start: 2025-05-30

## 2025-05-30 RX ORDER — CHOLECALCIFEROL (VITAMIN D3) 25 MCG
2000 TABLET ORAL DAILY
Qty: 180 TABLET | Refills: 1 | Status: SHIPPED | OUTPATIENT
Start: 2025-05-30

## 2025-05-30 RX ORDER — AMLODIPINE BESYLATE 2.5 MG/1
2.5 TABLET ORAL DAILY
Qty: 90 TABLET | Refills: 1 | Status: SHIPPED | OUTPATIENT
Start: 2025-05-30

## 2025-05-30 NOTE — ASSESSMENT & PLAN NOTE
Reviewed preventative medication recommendations that are age appropriate for the patient. Education provided for health and wellness. Encouraged healthy diet, regular exercise, and routine wellness checkups.  Pt agrees to cologaurd testing. Caretaker agrees to assist pt in getting sample sent.

## 2025-05-30 NOTE — PROGRESS NOTES
"Chief Complaint  Hypertension, Hyperlipidemia, Hyperglycemia, Anxiety, and Constipation    Subjective          Jamshid Carrero presents to [facility]  History of Present Illness  Pt here for physical and f/u   HTN: denies chest pain, palpitations, ha, dizziness  Anxiety: feels mood is doing good on current medicines  Denies feeling anxious  Denies si/hi       Past Medical History:   Diagnosis Date   • Dementia    • Hyperlipidemia    • Hypertension    • Stroke         History reviewed. No pertinent surgical history.     Current Outpatient Medications on File Prior to Visit   Medication Sig Dispense Refill   • Nutritional Supplements (Ensure Original) liquid Take 1 each by mouth Daily. 237 mL 12   • [DISCONTINUED] amLODIPine (NORVASC) 2.5 MG tablet Take 1 tablet by mouth Daily. 90 tablet 1   • [DISCONTINUED] atorvastatin (LIPITOR) 40 MG tablet Take 1 tablet by mouth Daily. 90 tablet 1   • [DISCONTINUED] clopidogrel (PLAVIX) 75 MG tablet Take 1 tablet by mouth Daily. 90 tablet 1   • [DISCONTINUED] OLANZapine (ZyPREXA) 5 MG tablet Take 1 tablet by mouth Every Night. 90 tablet 1   • [DISCONTINUED] sertraline (ZOLOFT) 50 MG tablet Take 1 tablet by mouth Daily for 180 days. 90 tablet 1   • [DISCONTINUED] Cholecalciferol 25 MCG (1000 UT) tablet Take 2 tablets by mouth Daily. (Patient not taking: Reported on 5/30/2025) 180 tablet 1     No current facility-administered medications on file prior to visit.        Allergies   Allergen Reactions   • Codeine Shortness Of Breath       Social History     Tobacco Use   Smoking Status Never   • Passive exposure: Never   Smokeless Tobacco Never          Objective   Vital Signs:   /72 (BP Location: Left arm, Patient Position: Sitting, Cuff Size: Adult)   Pulse 65   Temp 97.7 °F (36.5 °C) (Temporal)   Resp 16   Ht 182.9 cm (72.01\")   Wt 83 kg (183 lb)   SpO2 97%   BMI 24.81 kg/m²     Physical Exam  Vitals reviewed. "   Constitutional:       Appearance: Normal appearance.   HENT:      Head: Normocephalic and atraumatic.      Nose: Nose normal.      Mouth/Throat:      Mouth: Mucous membranes are moist.   Eyes:      Extraocular Movements: Extraocular movements intact.      Conjunctiva/sclera: Conjunctivae normal.      Pupils: Pupils are equal, round, and reactive to light.   Cardiovascular:      Rate and Rhythm: Normal rate and regular rhythm.   Pulmonary:      Effort: Pulmonary effort is normal.      Breath sounds: Normal breath sounds.   Abdominal:      General: Abdomen is flat. Bowel sounds are normal.      Palpations: Abdomen is soft.   Musculoskeletal:         General: Normal range of motion.      Comments: In wheel chair   Neurological:      General: No focal deficit present.      Mental Status: He is alert and oriented to person, place, and time.   Psychiatric:         Mood and Affect: Mood normal.      Result Review :            BMI is within normal parameters. No other follow-up for BMI required.              Assessment and Plan    Diagnoses and all orders for this visit:    1. Annual physical exam (Primary)  Assessment & Plan:  Reviewed preventative medication recommendations that are age appropriate for the patient. Education provided for health and wellness. Encouraged healthy diet, regular exercise, and routine wellness checkups.  Pt agrees to cologaurd testing. Caretaker agrees to assist pt in getting sample sent.      2. Anxiety  -     sertraline (ZOLOFT) 50 MG tablet; Take 1 tablet by mouth Daily for 180 days.  Dispense: 90 tablet; Refill: 1    3. Physical deconditioning    4. Mild protein-calorie malnutrition  Comments:  Continue nutritional supplement drinks daily for calories    5. Screening for colon cancer  -     Cologuard - Stool, Per Rectum; Future    6. Hyperlipidemia, unspecified hyperlipidemia type  Assessment & Plan:  Labs today, will adjust medication based on results.     Orders:  -     Comprehensive  Metabolic Panel  -     CBC & Differential  -     TSH  -     Lipid Panel    7. Essential hypertension  Assessment & Plan:  Hypertension is stable and controlled  Continue current treatment regimen.  Blood pressure will be reassessed in 6 months.      8. History of CVA (cerebrovascular accident)    9. Dementia with behavioral disturbance  Assessment & Plan:  Mood stable with current medicine.      Other orders  -     OLANZapine (ZyPREXA) 5 MG tablet; Take 1 tablet by mouth Every Night.  Dispense: 90 tablet; Refill: 1  -     clopidogrel (PLAVIX) 75 MG tablet; Take 1 tablet by mouth Daily.  Dispense: 90 tablet; Refill: 1  -     Cholecalciferol 25 MCG (1000 UT) tablet; Take 2 tablets by mouth Daily.  Dispense: 180 tablet; Refill: 1  -     atorvastatin (LIPITOR) 40 MG tablet; Take 1 tablet by mouth Daily.  Dispense: 90 tablet; Refill: 1  -     amLODIPine (NORVASC) 2.5 MG tablet; Take 1 tablet by mouth Daily.  Dispense: 90 tablet; Refill: 1        Follow Up   Return in about 6 months (around 11/30/2025).  Patient was given instructions and counseling regarding his condition or for health maintenance advice. Please see specific information pulled into the AVS if appropriate.

## 2025-06-13 ENCOUNTER — TELEPHONE (OUTPATIENT)
Dept: CASE MANAGEMENT | Facility: OTHER | Age: 65
End: 2025-06-13
Payer: MEDICAID

## 2025-06-13 NOTE — TELEPHONE ENCOUNTER
90 Day Chart Review. Monitoring for graduation. Patient has not had any UC/ED visits. No hospitalizations. Care Gaps are closed except for cologuard testing and this has been addressed and agreed upon with patient and caregiver. Patient has a recent Annual Wellness Visit with PCP. He has future appointments with PCP scheduled. No needs or concerns noted. I am closing the patient's CCM program and graduating him.

## 2025-07-28 ENCOUNTER — TELEPHONE (OUTPATIENT)
Dept: INTERNAL MEDICINE | Facility: CLINIC | Age: 65
End: 2025-07-28
Payer: MEDICAID

## 2025-07-28 NOTE — TELEPHONE ENCOUNTER
Caller: [facility]    Relationship to patient:     Best call back number: [phone]    Patient is needing: REPRESENTATIVE WITH [facility] CALLED WANTING TO KNOW IF THE OFFICE HAD RECEIVED THE FAX THEY SENT ON 7.22.25. CALLER STATES PART OF THE PATIENTS PAPERWORK WAS NOT FILLED OUT AND THEY ARE NEEDING PROVIDER TO REDO THE SECTION STATING WHAT TYPE OF EQUIPMENT IS NEEDING ORDERED. CALLER STATES THEY ARE NEEDING THIS FILLED OUT AND FAXED BACK AS SOON AS POSSIBLE SO THAT THE PATIENT IS ABLE TO RECEIVED HIS INCONTINENCE SUPPLIES.

## 2025-07-29 NOTE — TELEPHONE ENCOUNTER
Called and spoke with representative. They stated they have received an updated fax and will send it in for review and let us know if they require any additional information.

## 2025-08-07 ENCOUNTER — PATIENT MESSAGE (OUTPATIENT)
Dept: INTERNAL MEDICINE | Facility: CLINIC | Age: 65
End: 2025-08-07
Payer: MEDICAID

## 2025-08-07 DIAGNOSIS — F03.918 DEMENTIA WITH BEHAVIORAL DISTURBANCE: ICD-10-CM

## 2025-08-07 DIAGNOSIS — E44.1 MILD PROTEIN-CALORIE MALNUTRITION: Primary | ICD-10-CM

## 2025-08-07 DIAGNOSIS — Z86.73 HISTORY OF CVA (CEREBROVASCULAR ACCIDENT): ICD-10-CM

## 2025-08-07 DIAGNOSIS — R53.81 PHYSICAL DECONDITIONING: ICD-10-CM

## 2025-08-11 ENCOUNTER — TELEPHONE (OUTPATIENT)
Dept: INTERNAL MEDICINE | Facility: CLINIC | Age: 65
End: 2025-08-11
Payer: MEDICAID

## 2025-08-11 DIAGNOSIS — E44.1 MILD PROTEIN-CALORIE MALNUTRITION: ICD-10-CM

## 2025-08-11 DIAGNOSIS — R53.81 PHYSICAL DECONDITIONING: ICD-10-CM

## 2025-08-11 RX ORDER — LACTOSE-REDUCED FOOD
1 LIQUID (ML) ORAL 3 TIMES DAILY
Qty: 237 ML | Refills: 12 | Status: SHIPPED | OUTPATIENT
Start: 2025-08-11 | End: 2025-08-11

## 2025-08-11 RX ORDER — LACTOSE-REDUCED FOOD
1 LIQUID (ML) ORAL 3 TIMES DAILY
Qty: 63000 ML | Refills: 3 | Status: SHIPPED | OUTPATIENT
Start: 2025-08-11

## 2025-08-29 DIAGNOSIS — E44.1 MILD PROTEIN-CALORIE MALNUTRITION: ICD-10-CM

## 2025-08-29 DIAGNOSIS — R53.81 PHYSICAL DECONDITIONING: ICD-10-CM

## 2025-08-29 RX ORDER — LACTOSE-REDUCED FOOD
1 LIQUID (ML) ORAL 3 TIMES DAILY
Qty: 63000 ML | Refills: 12 | Status: SHIPPED | OUTPATIENT
Start: 2025-08-29